# Patient Record
Sex: FEMALE | Race: WHITE | Employment: FULL TIME | URBAN - METROPOLITAN AREA
[De-identification: names, ages, dates, MRNs, and addresses within clinical notes are randomized per-mention and may not be internally consistent; named-entity substitution may affect disease eponyms.]

---

## 2017-04-26 ENCOUNTER — OFFICE VISIT (OUTPATIENT)
Dept: MIDWIFE SERVICES | Age: 32
End: 2017-04-26

## 2017-04-26 VITALS
WEIGHT: 238 LBS | HEIGHT: 70 IN | HEART RATE: 73 BPM | BODY MASS INDEX: 34.07 KG/M2 | DIASTOLIC BLOOD PRESSURE: 45 MMHG | SYSTOLIC BLOOD PRESSURE: 129 MMHG

## 2017-04-26 DIAGNOSIS — R87.611 PAPANICOLAOU SMEAR OF CERVIX WITH ATYPICAL SQUAMOUS CELLS CANNOT EXCLUDE HIGH GRADE SQUAMOUS INTRAEPITHELIAL LESION (ASC-H): ICD-10-CM

## 2017-04-26 DIAGNOSIS — N92.0 EXCESSIVE OR FREQUENT MENSTRUATION: ICD-10-CM

## 2017-04-26 RX ORDER — DROSPIRENONE AND ETHINYL ESTRADIOL 0.02-3(28)
1 KIT ORAL DAILY
Qty: 3 PACKAGE | Refills: 4 | Status: SHIPPED | OUTPATIENT
Start: 2017-04-26 | End: 2018-05-08 | Stop reason: SDUPTHER

## 2017-04-26 NOTE — PROGRESS NOTES
Well Woman Check Up       Subjective:     32 y.o. CaucasianF   LMP: 2017 for routine annual exam    Social History: single partner, contraception - OCP (Oral Contraceptive Pills). Pertinent past medical history: . PAP History:     CERVICAL/ENDOCERVICAL IMAGE PROCESSED THIN PREP  SATISFACTORY FOR EVALUATION  NEGATIVE FOR INTRAEITHELIAL LESION OR MALIGNANCY   Positive HR HPV    Preventive Measures. Colonoscopy:  Age 48 or earlier based on signigicant FH  Mammogram:  Age 48 or earlier based on FH  DEXA: Age 72 or sooner based on risk factors  Lipids:  Age 36 or based on FH/risk factors      Patient Active Problem List    Diagnosis Date Noted    History of cervical dysplasia 2014    Reactive hypoglycemia 2011     Current Outpatient Prescriptions   Medication Sig Dispense Refill    drospirenone-ethinyl estradiol (MARY) 3-0.02 mg tab Take 1 Tab by mouth daily. Indications: Pregnancy Contraception 3 Package 4     No Known Allergies  Family History   Problem Relation Age of Onset    Cancer Maternal Grandmother      colon    MS Father     Diabetes Maternal Grandfather     Hypertension Mother     Hypertension Maternal Grandfather     High Cholesterol Mother         ROS:  Feeling well. No dyspnea or chest pain on exertion. No abdominal pain, change in bowel habits, black or bloody stools. No urinary tract symptoms. GYN ROS: normal menses, no abnormal bleeding, pelvic pain or discharge, no breast pain or new or enlarging lumps on self exam. No neurological complaints. Objective:     Visit Vitals    /45    Pulse 73    Ht 5' 10\" (1.778 m)    Wt 238 lb (108 kg)    LMP 2017    Breastfeeding No    BMI 34.15 kg/m2     Gen: The patient appears well, alert, oriented x 3, in no distress. ENT:  normal.   Neck: supple. No adenopathy or thyromegaly. JOSE. Lungs:  clear, good air entry, no wheezes, rhonchi or rales.    CV: S1 and S2 normal, no murmurs, regular rate and rhythm. Abdomen: soft without tenderness, guarding, mass or organomegaly. Extremities:  no edema, normal peripheral pulses. Neurological:normal, no focal findings. BREAST EXAM: Examined upright and supine. Benign symmetrical breasts with everted nipples. No masses, no nodes. No nipple retraction or discharge. No skin retraction or subclavicular or axillary lymphadenopathy. PELVIC EXAM: VULVA: normal appearing vulva with no masses, tenderness or lesions, VAGINA: normal appearing vagina with normal color and discharge, no lesions, CERVIX: normal appearing cervix without discharge or lesions, UTERUS: uterus is normal size, shape, consistency and nontender, ADNEXA: normal adnexa in size, nontender and no masses, PAP: Pap smear done today, thin-prep method, HPV test exam chaperoned by:  Enrique Aguirre RN      Assessment/Plan:     Raiza Connelly was seen today for well woman. Diagnoses and all orders for this visit:    Papanicolaou smear of cervix with atypical squamous cells cannot exclude high grade squamous intraepithelial lesion (ASC-H)  -     drospirenone-ethinyl estradiol (MARY) 3-0.02 mg tab; Take 1 Tab by mouth daily. Indications: Pregnancy Contraception    Excessive or frequent menstruation  -     drospirenone-ethinyl estradiol (MARY) 3-0.02 mg tab; Take 1 Tab by mouth daily. Indications: Pregnancy Contraception      Follow-up Disposition:  Return in about 1 year (around 4/26/2018) for Annual exam or sooner pending pap results. Magdaleno Woods MD, [address] [place] [address], Retired    Salvatore Dooley, [address] [facility]

## 2017-04-26 NOTE — PROGRESS NOTES
Chief Complaint   Patient presents with    Well Woman     Visit Vitals    /45    Pulse 73    Ht 5' 10\" (1.778 m)    Wt 238 lb (108 kg)    Breastfeeding No    BMI 34.15 kg/m2     1. Have you been to the ER, urgent care clinic since your last visit? Hospitalized since your last visit? No    2. Have you seen or consulted any other health care providers outside of the [facility] since your last visit? Include any pap smears or colon screening. No     Here today for well woman exam.  She has no complaints. She would like to be put back on the pill.

## 2017-04-26 NOTE — PATIENT INSTRUCTIONS
Thank you for choosing [facility]. We know you have many options when it comes to your healthcare; we appreciate that you picked us. Our goal is to provide exceptional service and world class care for every patient. You may receive a survey in the mail or by email asking for your feedback. Please take a few minutes to share your thoughts about your recent visit. Your comments helps us understand what we do well and what we can do better. To ensure confidentiality, this survey is administered by an independent third-party, [address] [place] [address] participation will help us to continue and improve the quality of care that we provide to you, your family, friends, and neighbors. Thank you! Abnormal Pap Test: Care Instructions  Your Care Instructions  A Pap test (also called a Pap smear) is used to look for early changes that may become cancer of the cervix. Your Pap test was abnormal. That may mean that some cells in your cervix have changed. The cell changes are most often caused by human papillomavirus (HPV) infection. An abnormal Pap test does not mean that the abnormal cells will lead to cancer. Changes in cervical cells may go away on their own or may progress slowly. Your doctor may want to repeat the Pap test or have you take other tests to see if you have cell changes. It is very important that you have regular Pap tests after you've had an abnormal Pap test.  Follow-up care is a key part of your treatment and safety. Be sure to make and go to all appointments, and call your doctor if you are having problems. It's also a good idea to know your test results and keep a list of the medicines you take. How can you care for yourself at home? · Do not smoke. Smoking may increase your risk for cervical cell changes. If you need help quitting, talk to your doctor about stop-smoking programs and medicines. These can increase your chances of quitting for good.   · Be sure to get follow-up Pap tests or other follow-up tests as recommended by your doctor. When should you call for help? Watch closely for changes in your health, and be sure to contact your doctor if you have any problems. Where can you learn more? Go to http://vivian-leonardo.info/. Enter P925 in the search box to learn more about \"Abnormal Pap Test: Care Instructions. \"  Current as of: July 26, 2016  Content Version: 11.2  © 4767-2086 [employer]. Care instructions adapted under license by [employer] (which disclaims liability or warranty for this information). If you have questions about a medical condition or this instruction, always ask your healthcare professional. [address] any warranty or liability for your use of this information.

## 2017-05-03 LAB
CYTOLOGIST CVX/VAG CYTO: ABNORMAL
CYTOLOGY CVX/VAG DOC THIN PREP: ABNORMAL
DX ICD CODE: ABNORMAL
DX ICD CODE: ABNORMAL
HPV I/H RISK 1 DNA CVX QL PROBE+SIG AMP: POSITIVE
Lab: ABNORMAL
OTHER STN SPEC: ABNORMAL
PATH REPORT.FINAL DX SPEC: ABNORMAL
PATHOLOGIST CVX/VAG CYTO: ABNORMAL
STAT OF ADQ CVX/VAG CYTO-IMP: ABNORMAL

## 2017-05-19 ENCOUNTER — OFFICE VISIT (OUTPATIENT)
Dept: MIDWIFE SERVICES | Age: 32
End: 2017-05-19

## 2017-05-19 ENCOUNTER — TELEPHONE (OUTPATIENT)
Dept: MIDWIFE SERVICES | Age: 32
End: 2017-05-19

## 2017-05-19 VITALS
HEIGHT: 70 IN | WEIGHT: 236 LBS | BODY MASS INDEX: 33.79 KG/M2 | HEART RATE: 68 BPM | SYSTOLIC BLOOD PRESSURE: 134 MMHG | DIASTOLIC BLOOD PRESSURE: 74 MMHG

## 2017-05-19 DIAGNOSIS — R87.613 HIGH GRADE SQUAMOUS INTRAEPITHELIAL CERVICAL DYSPLASIA: Primary | ICD-10-CM

## 2017-05-19 NOTE — PROGRESS NOTES
SURGICAL PROCEDURE Audra Kruse  :  1985  Age: 28 y.o. Surgical Procedure Description:  LEEP Cone under anesthesia   [address]      ICD-10-CM ICD-9-CM    1.  High grade squamous intraepithelial cervical dysplasia R87.613 795.04      Patient Active Problem List   Diagnosis Code    Reactive hypoglycemia E16.1    High grade squamous intraepithelial cervical dysplasia R87.613       Requested Date & Time:   17  TBD  Length of Procedure:  30 minutes  Anesthesia:  Choice  Same Day ?:  Yes  Admit?  no  Pre-Admission Testing? no   PAT Dx Codes  Z01.810    Cardiology     Z01.811 Respiratory   Z01.812 Laboratory   Z01.818 Other    Date and Time of PAT:___________________________________________________    SURGERY POSTED BY: _________________________________________________  Patient's Insurance Company:  ______________________________________________   Telephone #: ______________________________________________________   Member #      ______________________________________________________   Group #         ______________________________________________________   Authorization #  ____________________________________________________  Authorization per:  _______________________  Date & Time ________________  Patient notified:  Date ______________  Time:  _______________________  Packet given      Date ______________  On provider calendar:  _____________   Pre-certification notified  __________________  Authorization info entered into [employer]  ____________  By:  _______________________

## 2017-05-19 NOTE — PROGRESS NOTES
Chief Complaint   Patient presents with    Advice Only     discussing setting up LEEP     Visit Vitals    /74    Pulse 68    Ht 5' 10\" (1.778 m)    Wt 236 lb (107 kg)    BMI 33.86 kg/m2     1. Have you been to the ER, urgent care clinic since your last visit? Hospitalized since your last visit? No    2. Have you seen or consulted any other health care providers outside of the [facility] since your last visit? Include any pap smears or colon screening.  No     Here today for discussion and setting up a LEEP for her abnormal pap

## 2017-05-19 NOTE — LETTER
NOTIFICATION OF RETURN TO WORK / SCHOOL 
 
5/19/2017 9:52 AM 
 
Ms. Meagan Vega 
[address] [facility] [id number] [facility] Yamini To Whom It May Concern: 
 
Meagan Vega is going to be under the care of [address] from May 26, 2017 to May 29, 2017. She will be able to return to work/school on May 30, 2017 with no restrictions. If there are questions or concerns please have the patient contact our office. Sincerely, Nae Bello MD

## 2017-05-25 ENCOUNTER — ANESTHESIA EVENT (OUTPATIENT)
Dept: SURGERY | Age: 32
End: 2017-05-25
Payer: COMMERCIAL

## 2017-05-25 NOTE — PERIOP NOTES
Spoke with Yusef Peña from Dr. Gabriella Copeland office requesting orders for surgery.   DOS: 5/26/2017

## 2017-05-25 NOTE — PERIOP NOTES
[facility]  PREOPERATIVE INSTRUCTIONS    Surgery Date:  5/26/2017  Surgery arrival time given by surgeon: NO   If no,[facility] staff will call you between 4 PM- 8 PM the day before surgery with your arrival time. If your surgery is on a Monday, we will call you the preceding Friday. Please call [phone] after 8 PM if you did not receive your arrival time. 1. Please report at the designated time to the 2nd [facility]. Bring your insurance card, photo identification, and any copayment ( if applicable). 2. You must have a responsible adult to drive you home. You need to have a responsible adult to stay with you the first 24 hours after surgery if you are going home the same day of your surgery and you should not drive a car for 24 hours following your surgery. 3. Nothing to eat or drink after midnight the night before surgery. This includes no water, gum, mints, coffee, juice, etc.  Please note special instructions, if applicable, below for medications. 4. MEDICATIONS TO TAKE THE MORNING OF SURGERY WITH A SIP OF WATER: None  5. You MAY take your pain medication the day of surgery with a sip of water. 6. No alcoholic beverages 24 hours before or after your surgery. 7. If you are being admitted to the hospital,please leave personal belongings/luggage in your car until you have an assigned hospital room number. 8. Stop Aspirin and/or any non-steroidal anti-inflammatory drugs (i.e. Ibuprofen, Naproxen, Advil, Aleve) as directed by your prescribing physician. You may take Tylenol. Stop herbal supplements 1 week prior to  surgery. 9. If you are currently taking Plavix, Coumadin,or any other blood-thinning/anticoagulant medication contact your prescribing physician for instructions. 10. Please wear comfortable clothes. Wear your glasses instead of contacts. We ask that all money, jewelry and valuables be left at home. Wear no make up, particularly mascara, the day of surgery.    11.  All body piercings, rings,and jewelry need to be removed and left at home. Please wear your hair loose or down. Please no pony-tails, buns, or any metal hair accessories. If you shower the morning of surgery, please do not apply any lotions, powders, or deodorants afterwards. Do not shave any body area within 24 hours of your surgery. 12. Please follow all instructions to avoid any potential surgical cancellation. 13. Should your physical condition change, (i.e. fever, cold, flu, etc.) please notify your surgeon as soon as possible. 14. It is important to be on time. If a situation occurs where you may be delayed, please call:  [phone] on the day of surgery. 15. The Preadmission Testing staff can be reached at [phone]. Sidney Barthel 16. Special instructions:   1. Please bring your completed medication sheet with you the day of surgery. Please update any changes in medications. 2. Free  parking  The patient was contacted  via phone. She  verbalize  understanding of all instructions does not  need reinforcement.

## 2017-05-26 ENCOUNTER — ANESTHESIA (OUTPATIENT)
Dept: SURGERY | Age: 32
End: 2017-05-26
Payer: COMMERCIAL

## 2017-05-26 ENCOUNTER — HOSPITAL ENCOUNTER (OUTPATIENT)
Age: 32
Setting detail: OUTPATIENT SURGERY
Discharge: HOME | End: 2017-05-26
Attending: OBSTETRICS & GYNECOLOGY | Admitting: OBSTETRICS & GYNECOLOGY
Payer: COMMERCIAL

## 2017-05-26 VITALS
DIASTOLIC BLOOD PRESSURE: 56 MMHG | HEIGHT: 70 IN | SYSTOLIC BLOOD PRESSURE: 129 MMHG | OXYGEN SATURATION: 99 % | BODY MASS INDEX: 33.93 KG/M2 | WEIGHT: 236.99 LBS | HEART RATE: 68 BPM | RESPIRATION RATE: 17 BRPM | TEMPERATURE: 98.3 F

## 2017-05-26 DIAGNOSIS — R87.613 HIGH GRADE SQUAMOUS INTRAEPITHELIAL CERVICAL DYSPLASIA: ICD-10-CM

## 2017-05-26 LAB — HCG UR QL: NEGATIVE

## 2017-05-26 PROCEDURE — 77030020143 HC AIRWY LARYN INTUB CGAS -A: Performed by: ANESTHESIOLOGY

## 2017-05-26 PROCEDURE — 74011000250 HC RX REV CODE- 250

## 2017-05-26 PROCEDURE — 76210000020 HC REC RM PH II FIRST 0.5 HR: Performed by: OBSTETRICS & GYNECOLOGY

## 2017-05-26 PROCEDURE — 74011000250 HC RX REV CODE- 250: Performed by: OBSTETRICS & GYNECOLOGY

## 2017-05-26 PROCEDURE — 74011250636 HC RX REV CODE- 250/636: Performed by: ANESTHESIOLOGY

## 2017-05-26 PROCEDURE — 88305 TISSUE EXAM BY PATHOLOGIST: CPT | Performed by: OBSTETRICS & GYNECOLOGY

## 2017-05-26 PROCEDURE — 77030005537 HC CATH URETH BARD -A: Performed by: OBSTETRICS & GYNECOLOGY

## 2017-05-26 PROCEDURE — 77030020782 HC GWN BAIR PAWS FLX 3M -B

## 2017-05-26 PROCEDURE — 74011250636 HC RX REV CODE- 250/636

## 2017-05-26 PROCEDURE — 77030011640 HC PAD GRND REM COVD -A: Performed by: OBSTETRICS & GYNECOLOGY

## 2017-05-26 PROCEDURE — 77030018722 HC ELCTRD BALL LEEP UTMD -B: Performed by: OBSTETRICS & GYNECOLOGY

## 2017-05-26 PROCEDURE — 81025 URINE PREGNANCY TEST: CPT

## 2017-05-26 PROCEDURE — 77030032490 HC SLV COMPR SCD KNE COVD -B

## 2017-05-26 PROCEDURE — 76210000006 HC OR PH I REC 0.5 TO 1 HR: Performed by: OBSTETRICS & GYNECOLOGY

## 2017-05-26 PROCEDURE — 88307 TISSUE EXAM BY PATHOLOGIST: CPT | Performed by: OBSTETRICS & GYNECOLOGY

## 2017-05-26 PROCEDURE — 77030003666 HC NDL SPINAL BD -A: Performed by: OBSTETRICS & GYNECOLOGY

## 2017-05-26 PROCEDURE — 76010000138 HC OR TIME 0.5 TO 1 HR: Performed by: OBSTETRICS & GYNECOLOGY

## 2017-05-26 PROCEDURE — 76060000032 HC ANESTHESIA 0.5 TO 1 HR: Performed by: OBSTETRICS & GYNECOLOGY

## 2017-05-26 RX ORDER — IBUPROFEN 800 MG/1
800 TABLET ORAL
Qty: 20 TAB | Refills: 0 | Status: SHIPPED | OUTPATIENT
Start: 2017-05-26 | End: 2019-05-05

## 2017-05-26 RX ORDER — LIDOCAINE HYDROCHLORIDE 20 MG/ML
INJECTION, SOLUTION EPIDURAL; INFILTRATION; INTRACAUDAL; PERINEURAL AS NEEDED
Status: DISCONTINUED | OUTPATIENT
Start: 2017-05-26 | End: 2017-05-26 | Stop reason: HOSPADM

## 2017-05-26 RX ORDER — HYDROMORPHONE HYDROCHLORIDE 1 MG/ML
.25-1 INJECTION, SOLUTION INTRAMUSCULAR; INTRAVENOUS; SUBCUTANEOUS
Status: DISCONTINUED | OUTPATIENT
Start: 2017-05-26 | End: 2017-05-26 | Stop reason: HOSPADM

## 2017-05-26 RX ORDER — DIPHENHYDRAMINE HYDROCHLORIDE 50 MG/ML
12.5 INJECTION, SOLUTION INTRAMUSCULAR; INTRAVENOUS AS NEEDED
Status: DISCONTINUED | OUTPATIENT
Start: 2017-05-26 | End: 2017-05-26 | Stop reason: HOSPADM

## 2017-05-26 RX ORDER — DEXAMETHASONE SODIUM PHOSPHATE 4 MG/ML
INJECTION, SOLUTION INTRA-ARTICULAR; INTRALESIONAL; INTRAMUSCULAR; INTRAVENOUS; SOFT TISSUE AS NEEDED
Status: DISCONTINUED | OUTPATIENT
Start: 2017-05-26 | End: 2017-05-26 | Stop reason: HOSPADM

## 2017-05-26 RX ORDER — MIDAZOLAM HYDROCHLORIDE 1 MG/ML
INJECTION, SOLUTION INTRAMUSCULAR; INTRAVENOUS AS NEEDED
Status: DISCONTINUED | OUTPATIENT
Start: 2017-05-26 | End: 2017-05-26 | Stop reason: HOSPADM

## 2017-05-26 RX ORDER — PROPOFOL 10 MG/ML
INJECTION, EMULSION INTRAVENOUS AS NEEDED
Status: DISCONTINUED | OUTPATIENT
Start: 2017-05-26 | End: 2017-05-26 | Stop reason: HOSPADM

## 2017-05-26 RX ORDER — SODIUM CHLORIDE, SODIUM LACTATE, POTASSIUM CHLORIDE, CALCIUM CHLORIDE 600; 310; 30; 20 MG/100ML; MG/100ML; MG/100ML; MG/100ML
125 INJECTION, SOLUTION INTRAVENOUS CONTINUOUS
Status: DISCONTINUED | OUTPATIENT
Start: 2017-05-26 | End: 2017-05-26 | Stop reason: HOSPADM

## 2017-05-26 RX ORDER — LIDOCAINE HYDROCHLORIDE 10 MG/ML
0.1 INJECTION, SOLUTION EPIDURAL; INFILTRATION; INTRACAUDAL; PERINEURAL AS NEEDED
Status: DISCONTINUED | OUTPATIENT
Start: 2017-05-26 | End: 2017-05-26 | Stop reason: HOSPADM

## 2017-05-26 RX ORDER — FERRIC SUBSULFATE 20-22G/100
SOLUTION, NON-ORAL MISCELLANEOUS AS NEEDED
Status: DISCONTINUED | OUTPATIENT
Start: 2017-05-26 | End: 2017-05-26 | Stop reason: HOSPADM

## 2017-05-26 RX ORDER — FLUMAZENIL 0.1 MG/ML
0.2 INJECTION INTRAVENOUS
Status: DISCONTINUED | OUTPATIENT
Start: 2017-05-26 | End: 2017-05-26 | Stop reason: HOSPADM

## 2017-05-26 RX ORDER — FENTANYL CITRATE 50 UG/ML
INJECTION, SOLUTION INTRAMUSCULAR; INTRAVENOUS AS NEEDED
Status: DISCONTINUED | OUTPATIENT
Start: 2017-05-26 | End: 2017-05-26 | Stop reason: HOSPADM

## 2017-05-26 RX ORDER — LIDOCAINE HYDROCHLORIDE AND EPINEPHRINE 10; 10 MG/ML; UG/ML
INJECTION, SOLUTION INFILTRATION; PERINEURAL AS NEEDED
Status: DISCONTINUED | OUTPATIENT
Start: 2017-05-26 | End: 2017-05-26 | Stop reason: HOSPADM

## 2017-05-26 RX ORDER — NALOXONE HYDROCHLORIDE 0.4 MG/ML
0.2 INJECTION, SOLUTION INTRAMUSCULAR; INTRAVENOUS; SUBCUTANEOUS
Status: DISCONTINUED | OUTPATIENT
Start: 2017-05-26 | End: 2017-05-26 | Stop reason: HOSPADM

## 2017-05-26 RX ORDER — MIDAZOLAM HYDROCHLORIDE 1 MG/ML
2 INJECTION, SOLUTION INTRAMUSCULAR; INTRAVENOUS
Status: DISCONTINUED | OUTPATIENT
Start: 2017-05-26 | End: 2017-05-26 | Stop reason: HOSPADM

## 2017-05-26 RX ORDER — SODIUM CHLORIDE 0.9 % (FLUSH) 0.9 %
5-10 SYRINGE (ML) INJECTION EVERY 8 HOURS
Status: DISCONTINUED | OUTPATIENT
Start: 2017-05-26 | End: 2017-05-26 | Stop reason: HOSPADM

## 2017-05-26 RX ORDER — SODIUM CHLORIDE 0.9 % (FLUSH) 0.9 %
5-10 SYRINGE (ML) INJECTION AS NEEDED
Status: DISCONTINUED | OUTPATIENT
Start: 2017-05-26 | End: 2017-05-26 | Stop reason: HOSPADM

## 2017-05-26 RX ORDER — ACETIC ACID 5 %
LIQUID (ML) MISCELLANEOUS AS NEEDED
Status: DISCONTINUED | OUTPATIENT
Start: 2017-05-26 | End: 2017-05-26 | Stop reason: HOSPADM

## 2017-05-26 RX ORDER — ONDANSETRON 2 MG/ML
INJECTION INTRAMUSCULAR; INTRAVENOUS AS NEEDED
Status: DISCONTINUED | OUTPATIENT
Start: 2017-05-26 | End: 2017-05-26 | Stop reason: HOSPADM

## 2017-05-26 RX ADMIN — LIDOCAINE HYDROCHLORIDE 40 MG: 20 INJECTION, SOLUTION EPIDURAL; INFILTRATION; INTRACAUDAL; PERINEURAL at 07:33

## 2017-05-26 RX ADMIN — FENTANYL CITRATE 50 MCG: 50 INJECTION, SOLUTION INTRAMUSCULAR; INTRAVENOUS at 07:45

## 2017-05-26 RX ADMIN — HYDROMORPHONE HYDROCHLORIDE 0.5 MG: 1 INJECTION, SOLUTION INTRAMUSCULAR; INTRAVENOUS; SUBCUTANEOUS at 08:43

## 2017-05-26 RX ADMIN — FENTANYL CITRATE 50 MCG: 50 INJECTION, SOLUTION INTRAMUSCULAR; INTRAVENOUS at 07:30

## 2017-05-26 RX ADMIN — HYDROMORPHONE HYDROCHLORIDE 0.5 MG: 1 INJECTION, SOLUTION INTRAMUSCULAR; INTRAVENOUS; SUBCUTANEOUS at 08:31

## 2017-05-26 RX ADMIN — DEXAMETHASONE SODIUM PHOSPHATE 4 MG: 4 INJECTION, SOLUTION INTRA-ARTICULAR; INTRALESIONAL; INTRAMUSCULAR; INTRAVENOUS; SOFT TISSUE at 07:38

## 2017-05-26 RX ADMIN — MIDAZOLAM HYDROCHLORIDE 2 MG: 1 INJECTION, SOLUTION INTRAMUSCULAR; INTRAVENOUS at 07:30

## 2017-05-26 RX ADMIN — SODIUM CHLORIDE, SODIUM LACTATE, POTASSIUM CHLORIDE, AND CALCIUM CHLORIDE 125 ML/HR: 600; 310; 30; 20 INJECTION, SOLUTION INTRAVENOUS at 06:52

## 2017-05-26 RX ADMIN — PROPOFOL 150 MG: 10 INJECTION, EMULSION INTRAVENOUS at 07:33

## 2017-05-26 RX ADMIN — ONDANSETRON 4 MG: 2 INJECTION INTRAMUSCULAR; INTRAVENOUS at 08:04

## 2017-05-26 NOTE — ANESTHESIA POSTPROCEDURE EVALUATION
Post-Anesthesia Evaluation and Assessment    Patient: Andrew Merazn MRN: [id number]  SSN: [id number]    YOB: 1985  Age: 28 y.o. Sex: female       Cardiovascular Function/Vital Signs  Visit Vitals    /62    Pulse 74    Temp 36.8 °C (98.3 °F)    Resp 17    Ht 5' 10\" (1.778 m)    Wt 107.5 kg (236 lb 15.9 oz)    SpO2 98%    BMI 34.01 kg/m2       Patient is status post general anesthesia for Procedure(s):  LOOP ELECTRODE EXCISION PROCEDURE OF CERVIX (LEEP)/ CONIZATION. Nausea/Vomiting: None    Postoperative hydration reviewed and adequate. Pain:  Pain Scale 1: Numeric (0 - 10) (05/26/17 0843)  Pain Intensity 1: 2 (05/26/17 0843)   Managed    Neurological Status:   Neuro (WDL): Within Defined Limits (05/26/17 0848)  Neuro  Neurologic State: Alert (05/26/17 0848)  Orientation Level: Oriented X4 (05/26/17 0810)  Cognition: Appropriate decision making; Follows commands (05/26/17 0810)  LUE Motor Response: Purposeful (05/26/17 0848)  LLE Motor Response: Purposeful (05/26/17 0848)  RUE Motor Response: Purposeful (05/26/17 0848)  RLE Motor Response: Purposeful (05/26/17 0848)   At baseline    Mental Status and Level of Consciousness: Arousable    Pulmonary Status:   O2 Device: Room air (05/26/17 0854)   Adequate oxygenation and airway patent    Complications related to anesthesia: None    Post-anesthesia assessment completed.  No concerns    Signed By: Jalen Salgado MD     May 26, 2017

## 2017-05-26 NOTE — H&P (VIEW-ONLY)
Well Woman Check Up       Subjective:     32 y.o. CaucasianF   LMP: 2017 for routine annual exam    Social History: single partner, contraception - OCP (Oral Contraceptive Pills). Pertinent past medical history: . PAP History:     CERVICAL/ENDOCERVICAL IMAGE PROCESSED THIN PREP  SATISFACTORY FOR EVALUATION  NEGATIVE FOR INTRAEITHELIAL LESION OR MALIGNANCY   Positive HR HPV    Preventive Measures. Colonoscopy:  Age 48 or earlier based on signigicant FH  Mammogram:  Age 48 or earlier based on FH  DEXA: Age 72 or sooner based on risk factors  Lipids:  Age 36 or based on FH/risk factors      Patient Active Problem List    Diagnosis Date Noted    History of cervical dysplasia 2014    Reactive hypoglycemia 2011     Current Outpatient Prescriptions   Medication Sig Dispense Refill    drospirenone-ethinyl estradiol (MARY) 3-0.02 mg tab Take 1 Tab by mouth daily. Indications: Pregnancy Contraception 3 Package 4     No Known Allergies  Family History   Problem Relation Age of Onset    Cancer Maternal Grandmother      colon    MS Father     Diabetes Maternal Grandfather     Hypertension Mother     Hypertension Maternal Grandfather     High Cholesterol Mother         ROS:  Feeling well. No dyspnea or chest pain on exertion. No abdominal pain, change in bowel habits, black or bloody stools. No urinary tract symptoms. GYN ROS: normal menses, no abnormal bleeding, pelvic pain or discharge, no breast pain or new or enlarging lumps on self exam. No neurological complaints. Objective:     Visit Vitals    /45    Pulse 73    Ht 5' 10\" (1.778 m)    Wt 238 lb (108 kg)    LMP 2017    Breastfeeding No    BMI 34.15 kg/m2     Gen: The patient appears well, alert, oriented x 3, in no distress. ENT:  normal.   Neck: supple. No adenopathy or thyromegaly. JOSE. Lungs:  clear, good air entry, no wheezes, rhonchi or rales.    CV: S1 and S2 normal, no murmurs, regular rate and rhythm. Abdomen: soft without tenderness, guarding, mass or organomegaly. Extremities:  no edema, normal peripheral pulses. Neurological:normal, no focal findings. BREAST EXAM: Examined upright and supine. Benign symmetrical breasts with everted nipples. No masses, no nodes. No nipple retraction or discharge. No skin retraction or subclavicular or axillary lymphadenopathy. PELVIC EXAM: VULVA: normal appearing vulva with no masses, tenderness or lesions, VAGINA: normal appearing vagina with normal color and discharge, no lesions, CERVIX: normal appearing cervix without discharge or lesions, UTERUS: uterus is normal size, shape, consistency and nontender, ADNEXA: normal adnexa in size, nontender and no masses, PAP: Pap smear done today, thin-prep method, HPV test exam chaperoned by:  Santos Deluna RN      Assessment/Plan:     Kaylie Dougherty was seen today for well woman. Diagnoses and all orders for this visit:    Papanicolaou smear of cervix with atypical squamous cells cannot exclude high grade squamous intraepithelial lesion (ASC-H)  -     drospirenone-ethinyl estradiol (MARY) 3-0.02 mg tab; Take 1 Tab by mouth daily. Indications: Pregnancy Contraception    Excessive or frequent menstruation  -     drospirenone-ethinyl estradiol (MARY) 3-0.02 mg tab; Take 1 Tab by mouth daily. Indications: Pregnancy Contraception      Follow-up Disposition:  Return in about 1 year (around 4/26/2018) for Annual exam or sooner pending pap results. Bren Gibbons MD, [address] [place] [address], Retired    Edmar Dooley, [address]

## 2017-05-26 NOTE — DISCHARGE SUMMARY
Gynecology Surgical Discharge Summary     Name: Michael Yo MRN: [id number]  SSN: [id number]    YOB: 1985  Age: 28 y.o. Sex: female      Admit date: 5/26/2017    Discharge Date: 5/26/2017      Attending Physician: Kennon Heimlich, MD     Admission Diagnoses: High grade intraepithelial cervical dysplasia    Discharge Diagnoses: Principal Problem:    High grade squamous intraepithelial cervical dysplasia (2/3/2014)     Procedures: LEEP cone    Hospital Course: Normal hospital course for this procedure. Significant Diagnostic Studies:   Recent Results (from the past 24 hour(s))   HCG URINE, QL. - POC    Collection Time: 05/26/17  6:32 AM   Result Value Ref Range    Pregnancy test,urine (POC) NEGATIVE  NEG         Condition on Discharge:  normal    Patient Instructions:   Current Discharge Medication List      START taking these medications    Details   ibuprofen (MOTRIN) 800 mg tablet Take 1 Tab by mouth every eight (8) hours as needed for Pain. Indications: Pain  Qty: 20 Tab, Refills: 0         CONTINUE these medications which have NOT CHANGED    Details   drospirenone-ethinyl estradiol (MARY) 3-0.02 mg tab Take 1 Tab by mouth daily. Indications: Pregnancy Contraception  Qty: 3 Package, Refills: 4    Associated Diagnoses: Papanicolaou smear of cervix with atypical squamous cells cannot exclude high grade squamous intraepithelial lesion (ASC-H); Excessive or frequent menstruation            Activity: No sex, douching, or tampons for 6 weeks or as directed by your physician. No heavy lifting for 6 weeks. No driving while taking pain medication. Diet: Resume pre-hospital diet  Wound Care: None needed    Follow-up Appointments   Procedures    FOLLOW UP VISIT Appointment in: Two Weeks And 4-6 months for pap pending pathology     And 4-6 months for pap pending pathology     Standing Status:   Standing     Number of Occurrences:   1     Order Specific Question:   Appointment in     Answer:    Two Weeks        Signed By:          Tashi Torres MD, FACOG    LTC, AUS, Retired    Assistant Clinical Professor, [address]    May 26, 2017

## 2017-05-26 NOTE — OP NOTES
Operative Note    Preoperative Diagnosis: HIGH GRADE SQUAMOUS INTRAEPITHELIAL CERVICAL DYSPLASIA    Postoperative Diagnosis:  HIGH GRADE SQUAMOUS INTRAEPITHELIAL CERVICAL DYSPLASIA    Surgeon: Vanessa Kaplan MD    Assistants: Surgeon(s):  Vanessa Kaplan MD    Anesthesia:  General     Indications: The patient was admitted to the hospital with HGSIL. The patient now presents for LEEP cone after discussing therapeutic alternatives. Procedure in Detail:       After informed consent patient was taken to [address] at [facility], where she was transferred to the OR table in the supine position. After induction of General anesthesia the patient was moved to a  dorsal lithotomy position using yellow fin stirups. She was prepped and draped for a vaginal approach. Local anesthesia with epi injected at 4 quadrants for post op pain and hemorrhage control. The cervix was then cleaned with acetic acid followed by Lugol's solution and a LEEP cone was performed with ectocervical and endocervical specimens obtained and sent to pathology    The patient was awakening from general anesthesia and taken to the recovery room in stable condition. Findings:  No grossly visible lestions    Estimated Blood Loss: 10 mL           Drains: None                Specimens:   ID Type Source Tests Collected by Time Destination   1 : cervical leep Preservative Cervical  Vanessa Kaplan MD 5/26/2017 8565 Pathology   2 : endocervical leep Preservative Cervical  Vanessa Kaplan MD 5/26/2017 7606 Pathology               Implants: * No implants in log *           Complications:  None           Disposition: PACU - hemodynamically stable.            Condition: stable    Signed By: Vanessa Kaplan MD                         May 26, 2017

## 2017-05-26 NOTE — IP AVS SNAPSHOT
[address] [facility], [place] 
 
 
 [address] 
[phone] Patient: Joel Das MRN: [id number] IVET:[birth year] You are allergic to the following No active allergies Recent Documentation Height Weight BMI OB Status Smoking Status 1.778 m 107.5 kg 34.01 kg/m2 Having regular periods Never Smoker Emergency Contacts Name Discharge Info Relation Home Work Mobile Gina Andersen DISCHARGE CAREGIVER [3] Mother [14] [phone] About your hospitalization You were admitted on:  May 26, 2017 You last received care in the:  [facility] PACU You were discharged on:  May 26, 2017 Unit phone number:  [phone] Why you were hospitalized Your primary diagnosis was:  High Grade Squamous Intraepithelial Cervical Dysplasia Providers Seen During Your Hospitalizations Provider Role Specialty Primary office phone Janae Martinez MD Attending Provider Obstetrics & Gynecology [phone] Your Primary Care Physician (PCP) Primary Care Physician Office Phone Office Fax Víctor Loaiza [phone] Follow-up Information Follow up With Details Comments Contact Info MD Kisha Beckwith  [address] Internal Medicine [address] 
[phone] In 2 weeks Current Discharge Medication List  
  
START taking these medications Dose & Instructions Dispensing Information Comments Morning Noon Evening Bedtime  
 ibuprofen 800 mg tablet Commonly known as:  MOTRIN Your last dose was: Your next dose is:    
   
   
 Dose:  800 mg Take 1 Tab by mouth every eight (8) hours as needed for Pain. Indications: Pain Quantity:  20 Tab Refills:  0 CONTINUE these medications which have NOT CHANGED Dose & Instructions Dispensing Information Comments Morning Noon Evening Bedtime  
 drospirenone-ethinyl estradiol 3-0.02 mg Tab Commonly known as:  MARY Your last dose was: Your next dose is:    
   
   
 Dose:  1 Tab Take 1 Tab by mouth daily. Indications: Pregnancy Contraception Quantity:  3 Package Refills:  4 Where to Get Your Medications These medications were sent to [facility] [address] AT [address] [facility] [address] [place] [address], [address] [facility] [address] Hours:  24-hours Phone:  [phone]  
  ibuprofen 800 mg tablet Discharge Instructions Instructions Following Ambulatory Surgery Activity · As tolerated · Bathe or shower Diet · Regular diet · If nausea and vomiting continues, call your doctor Pain · Take pain medication as directed by your doctor ·  Call your doctor if pain is NOT relieved by medication · DO NOT take aspirin or blood thinners for 48 hours Dressing Care: none Follow-Up Phone Calls · Will be made nursing staff · If you have any problems, call your doctor as needed Call your doctor if 
· Excessive bleeding that does not stop after holding mild pressure over the area · Temperature of 101 degrees F or above · Redness,excessive swelling or bruising, and/or green or yellow, smelly discharge from incision After Anesthesia · For the first 24 hours: DO NOT Drive, Drink alcoholic beverages, or Make important decisions · Be aware of dizziness following anesthesia and while taking pain medication Medication changes have been made by your doctor; see Universal Medication form Continue home medications as previously prescribed with the addition of: Other Instructions: 
 
Appointment date/time:  2 week follow appointment Your doctor's phone number:  [phone] DISCHARGE SUMMARY from your Nurse The following personal items collected during your admission are returned to you: Dental Appliance: Dental Appliances: None Vision: Visual Aid: None Hearing Aid:   
Jewelry: Jewelry: None Clothing: Clothing: Footwear, Pants, Shirt, Undergarments (locker) Other Valuables: Other Valuables: Cell Phone, Purse (given to friend) Valuables sent to safe:   
 
PATIENT INSTRUCTIONS: 
 
After general anesthesia or intravenous sedation, for 24 hours or while taking prescription Narcotics: · Limit your activities · Do not drive and operate hazardous machinery · Do not make important personal or business decisions · Do  not drink alcoholic beverages · If you have not urinated within 8 hours after discharge, please contact your surgeon on call. Report the following to your surgeon: 
· Excessive pain, swelling, redness or odor of or around the surgical area · Temperature over 100.5 · Nausea and vomiting lasting longer than 4 hours or if unable to take medications · Any signs of decreased circulation or nerve impairment to extremity: change in color, persistent  numbness, tingling, coldness or increase pain · Any questions [address] Breathing deep and coughing are very important exercises to do after surgery. Deep breathing and coughing open the little air tubes and air sacks in your lungs. You take deep breaths every day. You may not even notice - it is just something you do when you sigh or yawn. It is a natural exercise you do to keep these air passages open. After surgery, take deep breaths and cough, on purpose. Coughing and deep breathing help prevent bronchitis and pneumonia after surgery. If you had chest or belly surgery, use a pillow as a \"hug buddy\" and hold it tightly to your chest or belly when you cough. DIRECTIONS: 
6. Take 10 to 15 slow deep breaths every hour while awake. 7. Breathe in deeply, and hold it for 2 seconds. 8. Exhale slowly through puckered lips, like blowing up a balloon. 9. After every 4th or 5th deep breath, hug your pillow to your chest or belly and give a hard, deep cough. Yes, it will probably hurt. But doing this exercise is very important part of healing after surgery. Take your pain medicine to help you do this exercise without too much pain. IF YOU HAVE BEEN DIAGNOSED WITH SLEEP APNEA, PLEASE USE YOUR SLEEIFP APNEA DEVICE OR CPAP MACHINE WHEN YOU INTEND TO NAP AFTER TAKING PAIN MEDICATION. Ankle Pumps Ankle pumps increase the circulation of oxygenated blood to your lower extremities and decrease your risk for circulation problems such as blood clots. They also stretch the muscles, tendons and ligaments in your foot and ankle, and prevent joint contracture in the ankle and foot, especially after surgeries on the legs. It is important to do ankle pump exercises regularly after surgery because immobility increases your risk for developing a blood clot. Your doctor may also have you take an Aspirin for the next few days as well. If your doctor did not ask you to take an Aspirin, consult with him before starting Aspirin therapy on your own. Slowly point your foot forward, feeling the muscles on the top of your lower leg stretch, and hold this position for 5 seconds. Next, pull your foot back toward you as far as possible, stretching the calf muscles, and hold that position for 5 seconds. Repeat with the other foot. Perform 10 repetitions every hour while awake for both ankles if possible (down and then up with the foot once is one repetition). You should feel gentle stretching of the muscles in your lower leg when doing this exercise. If you feel pain, or your range of motion is limited, don't  Push too hard. Only go the limit your joint and muscles will let you go. If you have increasing pain, progressively worsening leg warmth or swelling, STOP the exercise and call your doctor. Below is information about the medications your doctor is prescribing after your visit: 
 
Other information in your discharge envelope: PRESCRIPTIONS PHYSICAL THERAPY PRESCRIPTION 
     APPOINTMENT CARDS [facility] Pamphlet for block or block with On-Q Catheter from [facility] Medical device information sheets/pamphlets from their  School/work excuse note. /parent work excuse note. These are general instructions for a healthy lifestyle: *  Please give a list of your current medications to your Primary Care Provider. *  Please update this list whenever your medications are discontinued, doses are 
    changed, or new medications (including over-the-counter products) are added. *  Please carry medication information at all times in case of emergency situations. About Smoking No smoking / No tobacco products / Avoid exposure to second hand smoke Surgeon General's Warning:  Quitting smoking now greatly reduces serious risk to your health. Obesity, smoking, and sedentary lifestyle greatly increases your risk for illness and disease. A healthy diet, regular physical exercise & weight monitoring are important for maintaining a healthy lifestyle. Congestive Heart Failure You may be retaining fluid if you have a history of heart failure or if you experience any of the following symptoms:  Weight gain of 3 pounds or more overnight or 5 pounds in a week, increased swelling in our hands or feet or shortness of breath while lying flat in bed. Please call your doctor as soon as you notice any of these symptoms; do not wait until your next office visit. Recognize signs and symptoms of STROKE: 
F - face looks uneven A - arms unable to move or move even S - speech slurred or non-existent T - time-call 911 as soon as signs and symptoms begin-DO NOT go Back to bed or wait to see if you get better-TIME IS BRAIN. Warning signs of HEART ATTACK Call 911 if you have these symptoms · Chest discomfort. Most heart attacks involve discomfort in the center of the chest that lasts more than a few minutes, or that goes away and comes back. It can feel like uncomfortable pressure, squeezing, fullness, or pain. · Discomfort in other areas of the upper body. Symptoms can include pain or discomfort in one or both Arms, the back, neck, jaw, or stomach. ·  Shortness of breath with or without chest discomfort. · Other signs may include breaking out in a cold sweat, nausea, or lightheadedness Don't wait more than five minutes to call 911 - MINUTES MATTER! Fast action can save your life. Calling 911 is almost always the fastest way to get lifesaving treatment. Emergency Medical Services staff can begin treatment when they arrive - up to an hour sooner than if someone gets to the hospital by car. [facility] MEDICATION AND SIDE EFFECT GUIDE [facility] EFFECT GUIDE was provided to the PATIENT AND CARE PROVIDER. Information provided includes instruction about drug purpose and common side effects for the following medications: · Discharge Orders None Genmab Announcement We are excited to announce that we are making your provider's discharge notes available to you in Genmab. You will see these notes when they are completed and signed by the physician that discharged you from your recent hospital stay. If you have any questions or concerns about any information you see in Genmab, please call the Health Information Department where you were seen or reach out to your Primary Care Provider for more information about your plan of care. Introducing [facility] [place] [facility] SERVICES! Dear Osman Zavala: Thank you for requesting a Genmab account. Our records indicate that you already have an active Genmab account.   You can access your account anytime at [email or web address] Did you know that you can access your hospital and ER discharge instructions at any time in [email or web address]? You can also review all of your test results from your hospital stay or ER visit. Additional Information If you have questions, please visit the Frequently Asked Questions section of the [email or web address] website at [email or web address] Remember, [email or web address] is NOT to be used for urgent needs. For medical emergencies, dial 911. Now available from your iPhone and Android! General Information Please provide this summary of care documentation to your next provider. Patient Signature:  ____________________________________________________________ Date:  ____________________________________________________________  
  
[facility] Provider Signature:  ____________________________________________________________ Date:  ____________________________________________________________

## 2017-05-26 NOTE — INTERVAL H&P NOTE
H&P Update:  Cee Fisher was seen and examined. History and physical has been reviewed. The patient has been examined.  There have been no significant clinical changes since the completion of the originally dated History and Physical.    Signed By: Alicia Pierre MD     May 26, 2017 7:03 AM

## 2017-05-26 NOTE — DISCHARGE INSTRUCTIONS
Instructions Following Ambulatory Surgery    Activity  · As tolerated   · Bathe or shower     Diet  · Regular diet  · If nausea and vomiting continues, call your doctor    Pain  · Take pain medication as directed by your doctor  ·  Call your doctor if pain is NOT relieved by medication  · DO NOT take aspirin or blood thinners for 48 hours    Dressing Care: none    Follow-Up Phone Calls  · Will be made nursing staff  · If you have any problems, call your doctor as needed    Call your doctor if  · Excessive bleeding that does not stop after holding mild pressure over the area  · Temperature of 101 degrees F or above  · Redness,excessive swelling or bruising, and/or green or yellow, smelly discharge from incision    After Anesthesia  · For the first 24 hours: DO NOT Drive, Drink alcoholic beverages, or Make important decisions  · Be aware of dizziness following anesthesia and while taking pain medication    Medication changes have been made by your doctor; see Universal Medication form  Continue home medications as previously prescribed with the addition of: Other Instructions:    Appointment date/time:  2 week follow appointment  Your doctor's phone number:  [phone]      DISCHARGE SUMMARY from your Nurse    The following personal items collected during your admission are returned to you:   Dental Appliance: Dental Appliances: None  Vision: Visual Aid: None  Hearing Aid:    Jewelry: Jewelry: None  Clothing: Clothing: Footwear, Pants, Shirt, Undergarments (locker)  Other Valuables:  Other Valuables: Cell Phone, Purse (given to friend)  Valuables sent to safe:      PATIENT INSTRUCTIONS:    After general anesthesia or intravenous sedation, for 24 hours or while taking prescription Narcotics:  · Limit your activities  · Do not drive and operate hazardous machinery  · Do not make important personal or business decisions  · Do  not drink alcoholic beverages  · If you have not urinated within 8 hours after discharge, please contact your surgeon on call. Report the following to your surgeon:  · Excessive pain, swelling, redness or odor of or around the surgical area  · Temperature over 100.5  · Nausea and vomiting lasting longer than 4 hours or if unable to take medications  · Any signs of decreased circulation or nerve impairment to extremity: change in color, persistent  numbness, tingling, coldness or increase pain  · Any questions    COUGH AND DEEP BREATHE    Breathing deep and coughing are very important exercises to do after surgery. Deep breathing and coughing open the little air tubes and air sacks in your lungs. You take deep breaths every day. You may not even notice - it is just something you do when you sigh or yawn. It is a natural exercise you do to keep these air passages open. After surgery, take deep breaths and cough, on purpose. Coughing and deep breathing help prevent bronchitis and pneumonia after surgery. If you had chest or belly surgery, use a pillow as a \"hug buddy\" and hold it tightly to your chest or belly when you cough. DIRECTIONS:  6. Take 10 to 15 slow deep breaths every hour while awake. 7. Breathe in deeply, and hold it for 2 seconds. 8. Exhale slowly through puckered lips, like blowing up a balloon. 9. After every 4th or 5th deep breath, hug your pillow to your chest or belly and give a hard, deep cough. Yes, it will probably hurt. But doing this exercise is very important part of healing after surgery. Take your pain medicine to help you do this exercise without too much pain. IF YOU HAVE BEEN DIAGNOSED WITH SLEEP APNEA, PLEASE USE YOUR SLEEIFP APNEA DEVICE OR CPAP MACHINE WHEN YOU INTEND TO NAP AFTER TAKING PAIN MEDICATION. Ankle Pumps    Ankle pumps increase the circulation of oxygenated blood to your lower extremities and decrease your risk for circulation problems such as blood clots.  They also stretch the muscles, tendons and ligaments in your foot and ankle, and prevent joint contracture in the ankle and foot, especially after surgeries on the legs. It is important to do ankle pump exercises regularly after surgery because immobility increases your risk for developing a blood clot. Your doctor may also have you take an Aspirin for the next few days as well. If your doctor did not ask you to take an Aspirin, consult with him before starting Aspirin therapy on your own. Slowly point your foot forward, feeling the muscles on the top of your lower leg stretch, and hold this position for 5 seconds. Next, pull your foot back toward you as far as possible, stretching the calf muscles, and hold that position for 5 seconds. Repeat with the other foot. Perform 10 repetitions every hour while awake for both ankles if possible (down and then up with the foot once is one repetition). You should feel gentle stretching of the muscles in your lower leg when doing this exercise. If you feel pain, or your range of motion is limited, don't  Push too hard. Only go the limit your joint and muscles will let you go. If you have increasing pain, progressively worsening leg warmth or swelling, STOP the exercise and call your doctor. Below is information about the medications your doctor is prescribing after your visit:    Other information in your discharge envelope:  []     PRESCRIPTIONS  []     PHYSICAL THERAPY PRESCRIPTION  []     APPOINTMENT CARDS  []     [facility] Pamphlet for block or block with On-Q Catheter from An[facility]hesia Service  []     Medical device information sheets/pamphlets from their    []     School/work excuse note. []     /parent work excuse note. These are general instructions for a healthy lifestyle:    *  Please give a list of your current medications to your Primary Care Provider.   *  Please update this list whenever your medications are discontinued, doses are      changed, or new medications (including over-the-counter products) are added. *  Please carry medication information at all times in case of emergency situations. About Smoking  No smoking / No tobacco products / Avoid exposure to second hand smoke    Surgeon General's Warning:  Quitting smoking now greatly reduces serious risk to your health. Obesity, smoking, and sedentary lifestyle greatly increases your risk for illness and disease. A healthy diet, regular physical exercise & weight monitoring are important for maintaining a healthy lifestyle. Congestive Heart Failure  You may be retaining fluid if you have a history of heart failure or if you experience any of the following symptoms:  Weight gain of 3 pounds or more overnight or 5 pounds in a week, increased swelling in our hands or feet or shortness of breath while lying flat in bed. Please call your doctor as soon as you notice any of these symptoms; do not wait until your next office visit. Recognize signs and symptoms of STROKE:  F - face looks uneven  A - arms unable to move or move even  S - speech slurred or non-existent  T - time-call 911 as soon as signs and symptoms begin-DO NOT go         Back to bed or wait to see if you get better-TIME IS BRAIN. Warning signs of HEART ATTACK  Call 911 if you have these symptoms    · Chest discomfort. Most heart attacks involve discomfort in the center of the chest that lasts more than a few minutes, or that goes away and comes back. It can feel like uncomfortable pressure, squeezing, fullness, or pain. · Discomfort in other areas of the upper body. Symptoms can include pain or discomfort in one or both        Arms, the back, neck, jaw, or stomach. ·  Shortness of breath with or without chest discomfort. · Other signs may include breaking out in a cold sweat, nausea, or lightheadedness    Don't wait more than five minutes to call 911 - MINUTES MATTER!  Fast action can save your life.  Calling 911 is almost always the fastest way to get lifesaving treatment. Emergency Medical Services staff can begin treatment when they arrive - up to an hour sooner than if someone gets to the hospital by car. [facility] MEDICATION AND SIDE EFFECT GUIDE    [facility] MEDICATION AND SIDE EFFECT GUIDE was provided to the PATIENT AND CARE PROVIDER.   Information provided includes instruction about drug purpose and common side effects for the following medications:    ·

## 2017-05-26 NOTE — ANESTHESIA PREPROCEDURE EVALUATION
Anesthetic History   No history of anesthetic complications            Review of Systems / Medical History  Patient summary reviewed, nursing notes reviewed and pertinent labs reviewed    Pulmonary  Within defined limits                 Neuro/Psych   Within defined limits           Cardiovascular  Within defined limits                Exercise tolerance: >4 METS     GI/Hepatic/Renal  Within defined limits              Endo/Other  Within defined limits      Obesity     Other Findings              Physical Exam    Airway  Mallampati: II    Neck ROM: normal range of motion   Mouth opening: Normal     Cardiovascular  Regular rate and rhythm,  S1 and S2 normal,  no murmur, click, rub, or gallop  Rhythm: regular  Rate: normal         Dental  No notable dental hx       Pulmonary  Breath sounds clear to auscultation               Abdominal  GI exam deferred       Other Findings            Anesthetic Plan    ASA: 2  Anesthesia type: general          Induction: Intravenous  Anesthetic plan and risks discussed with: Patient

## 2017-05-27 NOTE — PROGRESS NOTES
GYN Visit Note          Chief Complaint: recurrent high grade MARLINE    HPI:    Orestes Langford  28 y.o. WF     LMP:    Discussed LEEP to remove abnormal tissue from cervix and reduce risk of cervical cancer    PAP History:  HGSIL and MARLINE II and III on biopsy    Review of Systems: -    General ROS: negative for - chills or fever  Respiratory ROS: no cough, shortness of breath, or wheezing  Cardiovascular ROS: no chest pain or dyspnea on exertion  Gastrointestinal ROS: no abdominal pain, change in bowel habits, or black or bloody stools  Genito-Urinary ROS: no dysuria, trouble voiding, or hematuria      OBJECTIVE:  Blood pressure 134/74, pulse 68, height 5' 10\" (1.778 m), weight 236 lb (107 kg), last menstrual period 2017, not currently breastfeeding. General appearance - alert, well appearing, and in no distress  Neck - supple, no significant adenopathy  Chest - clear to auscultation, no wheezes, rales or rhonchi, symmetric air entry  Heart - normal rate, regular rhythm, normal S1, S2, no murmurs, rubs, clicks or gallops  Abdomen - soft, nontender, nondistended, no masses or organomegaly  Pelvic - normal external genitalia, vulva, vagina, cervix, uterus and adnexa. Chaperoned by: Romeo Clinton RN     ASSESSMENT / PLAN:    [address] [place] [address]  was seen today for advice only. Diagnoses and all orders for this visit:    High grade squamous intraepithelial cervical dysplasia      Follow-up Disposition:  Return in about 1 week (around 2017) for LEEP under anesthesia. Allen Tellez MD, [address] [place] [address], Retired    Mendy Dooley, [address]

## 2017-06-07 ENCOUNTER — OFFICE VISIT (OUTPATIENT)
Dept: MIDWIFE SERVICES | Age: 32
End: 2017-06-07

## 2017-06-07 VITALS
WEIGHT: 236 LBS | DIASTOLIC BLOOD PRESSURE: 72 MMHG | SYSTOLIC BLOOD PRESSURE: 133 MMHG | BODY MASS INDEX: 33.79 KG/M2 | HEIGHT: 70 IN | HEART RATE: 64 BPM

## 2017-06-07 DIAGNOSIS — D06.9 ADENOCARCINOMA IN SITU OF CERVIX: ICD-10-CM

## 2017-06-07 DIAGNOSIS — Z48.816 ENCOUNTER FOR SURGICAL AFTERCARE FOLLOWING SURGERY OF GENITOURINARY SYSTEM: Primary | ICD-10-CM

## 2017-06-07 NOTE — PROGRESS NOTES
Chief Complaint   Patient presents with    Post OP Follow Up     2 weeks post op from LEEP     Visit Vitals    /72    Pulse 64    Ht 5' 10\" (1.778 m)    Wt 236 lb (107 kg)    BMI 33.86 kg/m2       1. Have you been to the ER, urgent care clinic since your last visit? Hospitalized since your last visit? No    2. Have you seen or consulted any other health care providers outside of the [address] [place] [address] since your last visit? Include any pap smears or colon screening. No     Here today for post op from LEEP.   Reports that she has no pain and never had any after the procedure

## 2017-06-12 ENCOUNTER — TELEPHONE (OUTPATIENT)
Dept: MIDWIFE SERVICES | Age: 32
End: 2017-06-12

## 2017-06-12 NOTE — TELEPHONE ENCOUNTER
Pt calling asking if Dr. Lyly Mello has spoken with another provider of hers to discuss results or a plan of action for her. Please call.

## 2017-06-12 NOTE — TELEPHONE ENCOUNTER
Responded to patient's [employer] message that dr Jimmy Barton is still awaiting to hear back from dr Hany Reid and that we will let her know something as soon as we find out.

## 2017-06-29 NOTE — PROGRESS NOTES
SURGICAL PROCEDURE Sherolyn Sandifer  :  1985  Age: 28 y.o. Surgical Procedure Description:   Robotic assisted laparoscopic total hysterectomy with bilateral salpingectomy, possible oophrectomy, possible open, and cystoscopy  CPT:[id number]        ICD-10-CM ICD-9-CM    1. Encounter for surgical aftercare following surgery of genitourinary system Z48.816 V58.76    2.  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  no  Pre-Admission Testing? no   PAT Dx Codes  Z01.810    Cardiology   no  Z01.811 Respiratory no  Z01.812 Laboratory yes  Z01.818 Other  no  Date and Time of PAT:___________________________________________________    SURGERY POSTED BY: _________________________________________________  Patient's Insurance Company:  ______________________________________________   Telephone #: ______________________________________________________   Member #      ______________________________________________________   Group #         ______________________________________________________   Authorization #  ____________________________________________________  Authorization per:  _______________________  Date & Time ________________  Patient notified:  Date ______________  Time:  _______________________  Packet given      Date ______________  On provider calendar:  _____________   Pre-certification notified  __________________  Authorization info entered into [employer]  ____________  By:  _______________________

## 2017-06-29 NOTE — PROGRESS NOTES
GYN Visit Note          Chief Complaint: Post op LEEP    HPI:    Jenni Console  28 y.o. WF     LMP:  here for follow up of LEEP under anesthesia which is reported below. I recommended she have a hysterectomy and felt that since there was no indication of deep invasion a simple hysterectomy was appropriate but that if she preferred I would refer her to gyn/onc for treatment. LEEP Report     SYNOPTIC REPORT   SPECIMEN   Specimen : Cervix   Procedure: Loop electrical excision procedure (LEEP)     TUMOR   Primary Tumor Site: Not specified   Tumor Size: < 0.7 mm   Histologic Type: Early microinvasive adenocarcinoma   Histologic Grade: G1   Tumor Extent   Depth of Invasion in Millimeters: <0.7 mm   Horizontal Extent in Millimeters: <0.5 mm   Accessory Tumor Findings   Lymph-Vascular Invasion: Not identified     MARGINS   Endocervical Margin: Uninvolved by invasive carcinoma   Status of Endocervical Margin Involvement by Squamous Intraepithelial Lesion: Involved by squamous intraepithelial lesion   Exocervical Margin: Uninvolved by invasive carcinoma   Status of Exocervical Margin Involvement by Squamous Intraepithelial Lesion: Uninvolved by squamous intraepithelial lesion   Deep Margin: Uninvolved by invasive carcinoma   Distance of Invasive Carcinoma from Margin: 1.5 mm     [employer] Patient Name: Jeffry Ahmadi Specimen #: [id number]   Patient Name: Jeffry Ahmadi Page 2 of 2 Printed: 17 3:33 PM SURGICAL PATHOLOGY REPORT   Specify Location: endocervical margin   Status of Deep Margin Involvement by Squamous Intraepithelial Lesion: Uninvolved by squamous intraepithelial lesion   Pathologic Staging   pT1a1   pNX   2. Endocervix, LEEP conization:   Benign endocervix.      Review of Systems: -    General ROS: negative for - chills, fever or malaise  Psychological ROS: negative for - anxiety or depression  Allergy and Immunology ROS: negative  negative for - REMINDER:DOCUMENT ALLERGIES IN ALLERGY ACTIVITY  Endocrine ROS: negative for - galactorrhea, hot flashes or mood swings  Breast ROS: negative for breast lumps  Respiratory ROS: no cough, shortness of breath, or wheezing  Cardiovascular ROS: no chest pain or dyspnea on exertion  Gastrointestinal ROS: no abdominal pain, change in bowel habits, or black or bloody stools  Genito-Urinary ROS: no dysuria, trouble voiding, or hematuria    Past, Family, Social History:    Patient Active Problem List    Diagnosis Date Noted    High grade squamous intraepithelial cervical dysplasia 02/03/2014    Reactive hypoglycemia 06/08/2011     Current Outpatient Prescriptions   Medication Sig Dispense Refill    drospirenone-ethinyl estradiol (MARY) 3-0.02 mg tab Take 1 Tab by mouth daily. Indications: Pregnancy Contraception 3 Package 4    ibuprofen (MOTRIN) 800 mg tablet Take 1 Tab by mouth every eight (8) hours as needed for Pain.  Indications: Pain 20 Tab 0     No Known Allergies  Past Medical History:   Diagnosis Date    Abnormal Pap smear     Dr. Sam Martinez, last normal 1/2010    AR (allergic rhinitis)     allergy resting normal per pt    Hypoglycemia 2004    Dr. Ferrera Chol     Past Surgical History:   Procedure Laterality Date    CONIZATION CERVIX,LOOP ELECTRD N/A 5/26/2017    LOOP ELECTRODE EXCISION PROCEDURE OF CERVIX (LEEP)/ CONIZATION performed by Joycelyn Kaur MD at [address] [facility] HX CHOLECYSTECTOMY  02/2014    HX HEENT      lasix    HX WISDOM TEETH EXTRACTION       Family History   Problem Relation Age of Onset    Cancer Maternal Grandmother      colon    MS Father     Diabetes Maternal Grandfather     Hypertension Maternal Grandfather     Hypertension Mother     High Cholesterol Mother      Social History   Substance Use Topics    Smoking status: Never Smoker    Smokeless tobacco: Never Used    Alcohol use Yes      Comment: 1 drink a month          OBJECTIVE:  Blood pressure 133/72, pulse 64, height 5' 10\" (1.778 m), weight 236 lb (107 kg), last menstrual period 05/22/2017, not currently breastfeeding. General appearance - alert, well appearing, and in no distress  Mental status - alert, oriented to person, place, and time, anxious  Neck - supple, no significant adenopathy, thyroid exam: thyroid is normal in size without nodules or tenderness  Lymphatics - no palpable lymphadenopathy, no hepatosplenomegaly  Chest - clear to auscultation, no wheezes, rales or rhonchi, symmetric air entry  Heart - normal rate, regular rhythm, normal S1, S2, no murmurs, rubs, clicks or gallops  Abdomen - soft, nontender, nondistended, no masses or organomegaly  Pelvic - normal external genitalia, vulva, vagina, cervix, uterus and adnexa  Rectal - deferred  Extremities - peripheral pulses normal, no pedal edema, no clubbing or cyanosis  Chaperoned by:  Yohannes Beach  RN     ASSESSMENT / PLAN:    Lexy Carrasco was seen today for post op follow up. Diagnoses and all orders for this visit:    Encounter for surgical aftercare following surgery of genitourinary system    Adenocarcinoma in situ of cervix      Follow-up Disposition: Not on File            Allen Masters MD, [address] [place] [address], Retired    Boubacar Dooley, [address]

## 2017-06-30 ENCOUNTER — OFFICE VISIT (OUTPATIENT)
Dept: MIDWIFE SERVICES | Age: 32
End: 2017-06-30

## 2017-06-30 VITALS
WEIGHT: 238 LBS | HEART RATE: 87 BPM | BODY MASS INDEX: 34.07 KG/M2 | SYSTOLIC BLOOD PRESSURE: 131 MMHG | HEIGHT: 70 IN | DIASTOLIC BLOOD PRESSURE: 69 MMHG

## 2017-06-30 DIAGNOSIS — C80.1: Primary | ICD-10-CM

## 2017-06-30 NOTE — PROGRESS NOTES
GYN Visit Note          Chief Complaint: does not want a hysterectomy    HPI:     Galina Jane  28 y.o. WF     LMP:  2017    Patient reports not wanting a hysterectomy at this time as she wants more children. Offered a CKC as an alternative and covered risk, possible cervical incompetence and need for frequent pap smears. i have also asked the advice of Gyn Onc (Dr. Sue Batista) as she will need close follow up due to my pending detention. Review of Systems: -    General ROS: negative for - chills, fever or malaise  Psychological ROS: positive for - anxiety  negative for - depression, disorientation, memory difficulties or mood swings  Cardiovascular ROS: no chest pain or dyspnea on exertion  Gastrointestinal ROS: no abdominal pain, change in bowel habits, or black or bloody stools  Genito-Urinary ROS: no dysuria, trouble voiding, or hematuria    Past, Family, Social History:    Patient Active Problem List    Diagnosis Date Noted    Squamous cell carcinoma, microinvasive ([facility] [address] Cervix 2017    Reactive hypoglycemia 2011     Current Outpatient Prescriptions   Medication Sig Dispense Refill    ibuprofen (MOTRIN) 800 mg tablet Take 1 Tab by mouth every eight (8) hours as needed for Pain. Indications: Pain 20 Tab 0    drospirenone-ethinyl estradiol (MARY) 3-0.02 mg tab Take 1 Tab by mouth daily.  Indications: Pregnancy Contraception 3 Package 4     No Known Allergies  Past Medical History:   Diagnosis Date    Abnormal Pap smear     Dr. Margo Rutherford, last normal 2010    Abnormal Papanicolaou smear of cervix     AR (allergic rhinitis)     allergy resting normal per pt    Hypoglycemia     Dr. Bo Nash     Past Surgical History:   Procedure Laterality Date    CONIZATION CERVIX,LOOP ELECTRD N/A 2017    LOOP ELECTRODE EXCISION PROCEDURE OF CERVIX (LEEP)/ CONIZATION performed by Nury Bell MD at [facility] [address] HX CHOLECYSTECTOMY  2014    HX HEENT      lasix    HX WISDOM TEETH EXTRACTION       Family History   Problem Relation Age of Onset    Cancer Maternal Grandmother      colon    MS Father     Diabetes Maternal Grandfather     Hypertension Maternal Grandfather     Hypertension Mother     High Cholesterol Mother      Social History   Substance Use Topics    Smoking status: Never Smoker    Smokeless tobacco: Never Used    Alcohol use Yes      Comment: 1 drink a month          OBJECTIVE:  Blood pressure 131/69, pulse 87, height 5' 10\" (1.778 m), weight 238 lb (108 kg), last menstrual period 06/11/2017, not currently breastfeeding. General appearance - alert, well appearing, and in no distress  Mental status - alert, oriented to person, place, and time  Abdomen - soft, nontender, nondistended, no masses or organomegaly       ASSESSMENT / PLAN:    Sylvester Zimmer was seen today for follow-up. Diagnoses and all orders for this visit:    Squamous cell carcinoma, microinvasive ([address]      Follow-up Disposition:  Return in about 6 weeks (around 8/11/2017) for pap and colpo. Procedures/Addenda   ADDENDUM Date Ordered: 6/30/2017 Status: Signed Out   Date Complete: 6/30/2017 By: Brad Salazar. Nolberto Car M.D. Date Reported: 6/30/2017   Addendum Comment   There is an error in the synoptic report in the category HISTOLOGIC TYPE. The correct histologic type is microinvasive squamous cell carcinoma, previously reported as microinvasive adenocarcinoma. The report is hereby corrected to read:   Histologic type: Microinvasive squamous cell carcinoma. Addendum Electronically Signed Out By Brad Salazar. Romeo Form, M.D. Alphia Lesch.  Teodora Munoz MD, [address] [place] [address], Retired    Elizabeth Dooley, [address] Medicine

## 2017-06-30 NOTE — PROGRESS NOTES
Chief Complaint   Patient presents with    Follow-up     Discuss surgery after abnormal pap     Visit Vitals    /69    Pulse 87    Ht 5' 10\" (1.778 m)    Wt 238 lb (108 kg)    LMP 06/11/2017    BMI 34.15 kg/m2     1. Have you been to the ER, urgent care clinic since your last visit? Hospitalized since your last visit? No    2. Have you seen or consulted any other health care providers outside of the [address] [place] [address] since your last visit? Include any pap smears or colon screening. No     Thank you for choosing [facility]. We know you have many options when it comes to your healthcare; we appreciate that you picked us. Our goal is to provide exceptional service and world class care for every patient. You may receive a survey in the mail or by email asking for your feedback. Please take a few minutes to share your thoughts about your recent visit. Your comments helps us understand what we do well and what we can do better. To ensure confidentiality, this survey is administered by an independent third-party, [employer] participation will help us to continue and improve the quality of care that we provide to you, your family, friends, and neighbors. Thank you!

## 2017-07-03 PROBLEM — C80.1: Status: ACTIVE | Noted: 2017-07-03

## 2018-04-29 DIAGNOSIS — R87.611 PAPANICOLAOU SMEAR OF CERVIX WITH ATYPICAL SQUAMOUS CELLS CANNOT EXCLUDE HIGH GRADE SQUAMOUS INTRAEPITHELIAL LESION (ASC-H): ICD-10-CM

## 2018-04-29 DIAGNOSIS — N92.0 EXCESSIVE OR FREQUENT MENSTRUATION: ICD-10-CM

## 2018-05-03 RX ORDER — DROSPIRENONE AND ETHINYL ESTRADIOL 0.02-3(28)
KIT ORAL
Qty: 84 TAB | Refills: 0 | OUTPATIENT
Start: 2018-05-03

## 2018-05-08 ENCOUNTER — TELEPHONE (OUTPATIENT)
Dept: OBGYN CLINIC | Age: 33
End: 2018-05-08

## 2018-05-08 DIAGNOSIS — N92.0 EXCESSIVE OR FREQUENT MENSTRUATION: ICD-10-CM

## 2018-05-08 DIAGNOSIS — R87.611 PAPANICOLAOU SMEAR OF CERVIX WITH ATYPICAL SQUAMOUS CELLS CANNOT EXCLUDE HIGH GRADE SQUAMOUS INTRAEPITHELIAL LESION (ASC-H): ICD-10-CM

## 2018-05-08 RX ORDER — DROSPIRENONE AND ETHINYL ESTRADIOL 0.02-3(28)
1 KIT ORAL DAILY
Qty: 1 PACKAGE | Refills: 0 | Status: SHIPPED | OUTPATIENT
Start: 2018-05-08 | End: 2018-05-23 | Stop reason: SDUPTHER

## 2018-05-23 ENCOUNTER — OFFICE VISIT (OUTPATIENT)
Dept: MIDWIFE SERVICES | Age: 33
End: 2018-05-23

## 2018-05-23 VITALS
HEIGHT: 70 IN | WEIGHT: 226.5 LBS | HEART RATE: 70 BPM | SYSTOLIC BLOOD PRESSURE: 122 MMHG | BODY MASS INDEX: 32.43 KG/M2 | DIASTOLIC BLOOD PRESSURE: 56 MMHG

## 2018-05-23 DIAGNOSIS — Z12.4 SCREENING FOR CERVICAL CANCER: ICD-10-CM

## 2018-05-23 DIAGNOSIS — R87.611 PAPANICOLAOU SMEAR OF CERVIX WITH ATYPICAL SQUAMOUS CELLS CANNOT EXCLUDE HIGH GRADE SQUAMOUS INTRAEPITHELIAL LESION (ASC-H): ICD-10-CM

## 2018-05-23 DIAGNOSIS — D06.9 CARCINOMA IN SITU OF CERVIX, UNSPECIFIED LOCATION: Primary | ICD-10-CM

## 2018-05-23 DIAGNOSIS — N92.0 EXCESSIVE OR FREQUENT MENSTRUATION: ICD-10-CM

## 2018-05-23 RX ORDER — DROSPIRENONE AND ETHINYL ESTRADIOL 0.02-3(28)
1 KIT ORAL DAILY
Qty: 1 PACKAGE | Refills: 0 | Status: SHIPPED | OUTPATIENT
Start: 2018-05-23 | End: 2018-06-30 | Stop reason: SDUPTHER

## 2018-05-23 NOTE — PATIENT INSTRUCTIONS
Thank you for choosing [address]. We know you have many options when it comes to your healthcare; we appreciate that you picked us. Our goal is to provide exceptional service and world class care for every patient. You may receive a survey in the mail or by email asking for your feedback. Please take a few minutes to share your thoughts about your recent visit. Your comments helps us understand what we do well and what we can do better. To ensure confidentiality, this survey is administered by an independent third-party, [employer] participation will help us to continue and improve the quality of care that we provide to you, your family, friends, and neighbors. Thank you! Well Visit, Ages 25 to 48: Care Instructions  Your Care Instructions    Physical exams can help you stay healthy. Your doctor has checked your overall health and may have suggested ways to take good care of yourself. He or she also may have recommended tests. At home, you can help prevent illness with healthy eating, regular exercise, and other steps. Follow-up care is a key part of your treatment and safety. Be sure to make and go to all appointments, and call your doctor if you are having problems. It's also a good idea to know your test results and keep a list of the medicines you take. How can you care for yourself at home? · Reach and stay at a healthy weight. This will lower your risk for many problems, such as obesity, diabetes, heart disease, and high blood pressure. · Get at least 30 minutes of physical activity on most days of the week. Walking is a good choice. You also may want to do other activities, such as running, swimming, cycling, or playing tennis or team sports. Discuss any changes in your exercise program with your doctor. · Do not smoke or allow others to smoke around you. If you need help quitting, talk to your doctor about stop-smoking programs and medicines. These can increase your chances of quitting for good. · Talk to your doctor about whether you have any risk factors for sexually transmitted infections (STIs). Having one sex partner (who does not have STIs and does not have sex with anyone else) is a good way to avoid these infections. · Use birth control if you do not want to have children at this time. Talk with your doctor about the choices available and what might be best for you. · Protect your skin from too much sun. When you're outdoors from 10 a.m. to 4 p.m., stay in the shade or cover up with clothing and a hat with a wide brim. Wear sunglasses that block UV rays. Even when it's cloudy, put broad-spectrum sunscreen (SPF 30 or higher) on any exposed skin. · See a dentist one or two times a year for checkups and to have your teeth cleaned. · Wear a seat belt in the car. · Drink alcohol in moderation, if at all. That means no more than 2 drinks a day for men and 1 drink a day for women. Follow your doctor's advice about when to have certain tests. These tests can spot problems early. For everyone  · Cholesterol. Have the fat (cholesterol) in your blood tested after age 21. Your doctor will tell you how often to have this done based on your age, family history, or other things that can increase your risk for heart disease. · Blood pressure. Have your blood pressure checked during a routine doctor visit. Your doctor will tell you how often to check your blood pressure based on your age, your blood pressure results, and other factors. · Vision. Talk with your doctor about how often to have a glaucoma test.  · Diabetes. Ask your doctor whether you should have tests for diabetes. · Colon cancer. Have a test for colon cancer at age 48. You may have one of several tests. If you are younger than 48, you may need a test earlier if you have any risk factors.  Risk factors include whether you already had a precancerous polyp removed from your colon or whether your parent, brother, sister, or child has had colon cancer. For women  · Breast exam and mammogram. Talk to your doctor about when you should have a clinical breast exam and a mammogram. Medical experts differ on whether and how often women under 50 should have these tests. Your doctor can help you decide what is right for you. · Pap test and pelvic exam. Begin Pap tests at age 24. A Pap test is the best way to find cervical cancer. The test often is part of a pelvic exam. Ask how often to have this test.  · Tests for sexually transmitted infections (STIs). Ask whether you should have tests for STIs. You may be at risk if you have sex with more than one person, especially if your partners do not wear condoms. For men  · Tests for sexually transmitted infections (STIs). Ask whether you should have tests for STIs. You may be at risk if you have sex with more than one person, especially if you do not wear a condom. · Testicular cancer exam. Ask your doctor whether you should check your testicles regularly. · Prostate exam. Talk to your doctor about whether you should have a blood test (called a PSA test) for prostate cancer. Experts differ on whether and when men should have this test. Some experts suggest it if you are older than 39 and are -American or have a father or brother who got prostate cancer when he was younger than 72. When should you call for help? Watch closely for changes in your health, and be sure to contact your doctor if you have any problems or symptoms that concern you. Where can you learn more? Go to http://vivian-leonardo.info/. Enter P072 in the search box to learn more about \"Well Visit, Ages 25 to 48: Care Instructions. \"  Current as of: May 12, 2017  Content Version: 11.4  © 4492-0976 Healthwise, Inc[employer]. Care instructions adapted under license by [employer] (which disclaims liability or warranty for this information).  If you have questions about a medical condition or this instruction, always ask your healthcare professional. [address] any warranty or liability for your use of this information. Abnormal Pap Test: Care Instructions  Your Care Instructions    A Pap test (also called a Pap smear) is used to look for early changes that may become cancer of the cervix. Your Pap test was abnormal. That may mean that some cells in your cervix have changed. The cell changes are most often caused by human papillomavirus (HPV) infection. An abnormal Pap test does not mean that the abnormal cells will lead to cancer. Changes in cervical cells may go away on their own or may progress slowly. Your doctor may want to repeat the Pap test or have you take other tests to see if you have cell changes. It is very important that you have regular Pap tests after you've had an abnormal Pap test.  Follow-up care is a key part of your treatment and safety. Be sure to make and go to all appointments, and call your doctor if you are having problems. It's also a good idea to know your test results and keep a list of the medicines you take. How can you care for yourself at home? · Do not smoke. Smoking may increase your risk for cervical cell changes. If you need help quitting, talk to your doctor about stop-smoking programs and medicines. These can increase your chances of quitting for good. · Be sure to get follow-up Pap tests or other follow-up tests as recommended by your doctor. When should you call for help? Watch closely for changes in your health, and be sure to contact your doctor if you have any problems. Where can you learn more? Go to http://vivian-leonardo.info/. Enter P925 in the search box to learn more about \"Abnormal Pap Test: Care Instructions. \"  Current as of: May 12, 2017  Content Version: 11.4  © 6672-6438 Healthwise, [employer].  Care instructions adapted under license by [employer] (which disclaims liability or warranty for this information). If you have questions about a medical condition or this instruction, always ask your healthcare professional. [address] any warranty or liability for your use of this information.

## 2018-05-23 NOTE — MR AVS SNAPSHOT
[address] [address] 1007 [facility] 
[phone] Patient: Prudence Felty MRN: [id number] Visit Information Date & Time Provider Department Dept. Phone Encounter #  
 2018  9:00 AM [facility] [address] [id number] Upcoming Health Maintenance Date Due Influenza Age 5 to Adult 2018 PAP AKA CERVICAL CYTOLOGY 2020 Allergies as of 2018  Review Complete On: 2018 By: Renetta Davis RN No Known Allergies Current Immunizations  Reviewed on 12/3/2013 Name Date Hepatitis B Vaccine 2011, 3/14/2011, 2011 MMR Vaccine 3/18/1996, 1986 Meningococcal Vaccine 2003 PPD 2011 TD Vaccine 2005, 1985, 1985 TDAP Vaccine 2000 Td, Adsorbed PF 12/3/2013 Not reviewed this visit You Were Diagnosed With   
  
 Codes Comments Carcinoma in situ of cervix, unspecified location    -  Primary ICD-10-CM: D06.9 ICD-9-CM: 233.1 Vitals BP Pulse Height(growth percentile) Weight(growth percentile) LMP BMI  
 122/56 70 5' 10\" (1.778 m) 226 lb 8 oz (102.7 kg) 2018 (Approximate) 32.5 kg/m2 OB Status Smoking Status Having regular periods Never Smoker BMI and BSA Data Body Mass Index Body Surface Area 32.5 kg/m 2 2.25 m 2 Preferred Pharmacy Pharmacy Name Phone [address] [facility] AT [address] [place] [address]. [phone] Your Updated Medication List  
  
   
This list is accurate as of 18 10:09 AM.  Always use your most recent med list.  
  
  
  
  
 drospirenone-ethinyl estradiol 3-0.02 mg Tab Commonly known as:  MARY Take 1 Tab by mouth daily. Indications: Pregnancy Contraception  
  
 ibuprofen 800 mg tablet Commonly known as:  MOTRIN  
 Take 1 Tab by mouth every eight (8) hours as needed for Pain. Indications: Pain We Performed the Following REFERRAL TO GYN ONCOLOGY [XSL88 Custom] Referral Information Referral ID Referred By Referred To  
  
 [id number] Vanda Díaz MD   
   [address] [facility] [address] [place] [address] Phone: [phone] Fax: [phone] Visits Status Start Date End Date 1 New Request 5/23/18 5/23/19 If your referral has a status of pending review or denied, additional information will be sent to support the outcome of this decision. Patient Instructions Thank you for choosing [facility]. We know you have many options when it comes to your healthcare; we appreciate that you picked us. Our goal is to provide exceptional service and world class care for every patient. You may receive a survey in the mail or by email asking for your feedback. Please take a few minutes to share your thoughts about your recent visit. Your comments helps us understand what we do well and what we can do better. To ensure confidentiality, this survey is administered by an independent third-party, PaeDae Banks participation will help us to continue and improve the quality of care that we provide to you, your family, friends, and neighbors. Thank you! Well Visit, Ages 25 to 48: Care Instructions Your Care Instructions Physical exams can help you stay healthy. Your doctor has checked your overall health and may have suggested ways to take good care of yourself. He or she also may have recommended tests. At home, you can help prevent illness with healthy eating, regular exercise, and other steps. Follow-up care is a key part of your treatment and safety.  Be sure to make and go to all appointments, and call your doctor if you are having problems. It's also a good idea to know your test results and keep a list of the medicines you take. How can you care for yourself at home? · Reach and stay at a healthy weight. This will lower your risk for many problems, such as obesity, diabetes, heart disease, and high blood pressure. · Get at least 30 minutes of physical activity on most days of the week. Walking is a good choice. You also may want to do other activities, such as running, swimming, cycling, or playing tennis or team sports. Discuss any changes in your exercise program with your doctor. · Do not smoke or allow others to smoke around you. If you need help quitting, talk to your doctor about stop-smoking programs and medicines. These can increase your chances of quitting for good. · Talk to your doctor about whether you have any risk factors for sexually transmitted infections (STIs). Having one sex partner (who does not have STIs and does not have sex with anyone else) is a good way to avoid these infections. · Use birth control if you do not want to have children at this time. Talk with your doctor about the choices available and what might be best for you. · Protect your skin from too much sun. When you're outdoors from 10 a.m. to 4 p.m., stay in the shade or cover up with clothing and a hat with a wide brim. Wear sunglasses that block UV rays. Even when it's cloudy, put broad-spectrum sunscreen (SPF 30 or higher) on any exposed skin. · See a dentist one or two times a year for checkups and to have your teeth cleaned. · Wear a seat belt in the car. · Drink alcohol in moderation, if at all. That means no more than 2 drinks a day for men and 1 drink a day for women. Follow your doctor's advice about when to have certain tests. These tests can spot problems early. For everyone · Cholesterol. Have the fat (cholesterol) in your blood tested after age 21.  Your doctor will tell you how often to have this done based on your age, family history, or other things that can increase your risk for heart disease. · Blood pressure. Have your blood pressure checked during a routine doctor visit. Your doctor will tell you how often to check your blood pressure based on your age, your blood pressure results, and other factors. · Vision. Talk with your doctor about how often to have a glaucoma test. 
· Diabetes. Ask your doctor whether you should have tests for diabetes. · Colon cancer. Have a test for colon cancer at age 48. You may have one of several tests. If you are younger than 48, you may need a test earlier if you have any risk factors. Risk factors include whether you already had a precancerous polyp removed from your colon or whether your parent, brother, sister, or child has had colon cancer. For women · Breast exam and mammogram. Talk to your doctor about when you should have a clinical breast exam and a mammogram. Medical experts differ on whether and how often women under 50 should have these tests. Your doctor can help you decide what is right for you. · Pap test and pelvic exam. Begin Pap tests at age 24. A Pap test is the best way to find cervical cancer. The test often is part of a pelvic exam. Ask how often to have this test. 
· Tests for sexually transmitted infections (STIs). Ask whether you should have tests for STIs. You may be at risk if you have sex with more than one person, especially if your partners do not wear condoms. For men · Tests for sexually transmitted infections (STIs). Ask whether you should have tests for STIs. You may be at risk if you have sex with more than one person, especially if you do not wear a condom. · Testicular cancer exam. Ask your doctor whether you should check your testicles regularly. · Prostate exam. Talk to your doctor about whether you should have a blood test (called a PSA test) for prostate cancer.  Experts differ on whether and when men should have this test. Some experts suggest it if you are older than 39 and are -American or have a father or brother who got prostate cancer when he was younger than 72. When should you call for help? Watch closely for changes in your health, and be sure to contact your doctor if you have any problems or symptoms that concern you. Where can you learn more? Go to http://vivian-leonardo.info/. Enter P072 in the search box to learn more about \"Well Visit, Ages 25 to 48: Care Instructions. \" Current as of: May 12, 2017 Content Version: 11.4 © 6366-5706 Lucibel. Care instructions adapted under license by [employer] (which disclaims liability or warranty for this information). If you have questions about a medical condition or this instruction, always ask your healthcare professional. [address] any warranty or liability for your use of this information. Abnormal Pap Test: Care Instructions Your Care Instructions A Pap test (also called a Pap smear) is used to look for early changes that may become cancer of the cervix. Your Pap test was abnormal. That may mean that some cells in your cervix have changed. The cell changes are most often caused by human papillomavirus (HPV) infection. An abnormal Pap test does not mean that the abnormal cells will lead to cancer. Changes in cervical cells may go away on their own or may progress slowly. Your doctor may want to repeat the Pap test or have you take other tests to see if you have cell changes. It is very important that you have regular Pap tests after you've had an abnormal Pap test. 
Follow-up care is a key part of your treatment and safety. Be sure to make and go to all appointments, and call your doctor if you are having problems. It's also a good idea to know your test results and keep a list of the medicines you take. How can you care for yourself at home? · Do not smoke. Smoking may increase your risk for cervical cell changes. If you need help quitting, talk to your doctor about stop-smoking programs and medicines. These can increase your chances of quitting for good. · Be sure to get follow-up Pap tests or other follow-up tests as recommended by your doctor. When should you call for help? Watch closely for changes in your health, and be sure to contact your doctor if you have any problems. Where can you learn more? Go to http://vivian-leonardo.info/. Enter P925 in the search box to learn more about \"Abnormal Pap Test: Care Instructions. \" Current as of: May 12, 2017 Content Version: 11.4 © 0223-1098 Open CS. Care instructions adapted under license by [employer] (which disclaims liability or warranty for this information). If you have questions about a medical condition or this instruction, always ask your healthcare professional. [address] [address] any warranty or liability for your use of this information. Introducing [facility] & HEALTH SERVICES! Dear Jewels Allan: Thank you for requesting a [employer] account. Our records indicate that you already have an active [employer] account. You can access your account anytime at [email or web address] "[email or web address]"/[email or web address] Did you know that you can access your hospital and ER discharge instructions at any time in [employer]? You can also review all of your test results from your hospital stay or ER visit. Additional Information If you have questions, please visit the Frequently Asked Questions section of the [employer] website at [email or web address] Remember, [employer] is NOT to be used for urgent needs. For medical emergencies, dial 911. Now available from your iPhone and Android! Please provide this summary of care documentation to your next provider. Your primary care clinician is listed as Jen Patterson  If you have any questions after today's visit, please call [phone].

## 2018-05-23 NOTE — PROGRESS NOTES
Annual exam ages 21-44    Maria L Pace is a ,  35 y.o. female [facility][note 3] Patient's last menstrual period was 2018 (approximate). .    She presents for her annual checkup. She is having no significant problems. With regard to follow up for her squamous cell carcinoma she never went to heme/onc or followed up with 6 wk post leep pap and colposcopy. She would like a refill on her OCP      Menstrual status:    Her periods are light in flow. She is using three to five pads or tampons per day, usually regular and last 26-30 days. She denies dysmenorrhea. She reports no premenstrual symptoms. Contraception:    The current method of family planning is OCP (estrogen/progesterone). Sexual history:     She  reports that she currently engages in sexual activity and has had male partners. She reports using the following method of birth control/protection: Pill. .    Medical conditions:    Since her last annual GYN exam about one year ago, she has not the following changes in her health history: none. Pap and Mammogram History:    Her most recent Pap smear was see report, obtained 1 year(s) ago. The patient has never had a mammogram.    Breast Cancer History/Substance Abuse: negative    Past Medical History:   Diagnosis Date    Abnormal Pap smear     Dr. Tung Sadler, last normal 2010    Abnormal Papanicolaou smear of cervix     AR (allergic rhinitis)     allergy resting normal per pt    Hypoglycemia     Dr. Lory Gonzalez     Past Surgical History:   Procedure Laterality Date    HX CHOLECYSTECTOMY  2014    HX HEENT      lasix    HX WISDOM TEETH EXTRACTION      MA CONIZATION CERVIX,LOOP ELECTRD N/A 2017    LOOP ELECTRODE EXCISION PROCEDURE OF CERVIX (LEEP)/ CONIZATION performed by Mehnaz Tapia MD at [facility] MAIN OR       Current Outpatient Prescriptions   Medication Sig Dispense Refill    drospirenone-ethinyl estradiol (MARY) 3-0.02 mg tab Take 1 Tab by mouth daily. Indications: Pregnancy Contraception 1 Package 0    ibuprofen (MOTRIN) 800 mg tablet Take 1 Tab by mouth every eight (8) hours as needed for Pain. Indications: Pain 20 Tab 0     Allergies: Review of patient's allergies indicates no known allergies. Tobacco History:  reports that she has never smoked. She has never used smokeless tobacco.  Alcohol Abuse:  reports that she drinks alcohol. Drug Abuse:  reports that she does not use illicit drugs.     Family Medical/Cancer History:   Family History   Problem Relation Age of Onset    Cancer Maternal Grandmother      colon    MS Father     Diabetes Maternal Grandfather     Hypertension Maternal Grandfather     Hypertension Mother     High Cholesterol Mother         Review of Systems - History obtained from the patient  Constitutional: negative for weight loss, fever, night sweats  HEENT: negative for hearing loss, earache, congestion, snoring, sorethroat  CV: negative for chest pain, palpitations, edema  Resp: negative for cough, shortness of breath, wheezing  GI: negative for change in bowel habits, abdominal pain, black or bloody stools  : negative for frequency, dysuria, hematuria, vaginal discharge  MSK: negative for back pain, joint pain, muscle pain  Breast: negative for breast lumps, nipple discharge, galactorrhea  Skin :negative for itching, rash, hives  Neuro: negative for dizziness, headache, confusion, weakness  Psych: negative for anxiety, depression, change in mood  Heme/lymph: negative for bleeding, bruising, pallor    Physical Exam    Visit Vitals    /56    Pulse 70    Ht 5' 10\" (1.778 m)    Wt 226 lb 8 oz (102.7 kg)    LMP 04/27/2018 (Approximate)    BMI 32.5 kg/m2       Constitutional  · Appearance: well-nourished, well developed, alert, in no acute distress    HENT  · Head and Face: appears normal    Neck  · Inspection/Palpation: normal appearance, no masses or tenderness  · Lymph Nodes: no lymphadenopathy present  · Thyroid: gland size normal, nontender, no nodules or masses present on palpation    Chest  · Respiratory Effort: breathing unlabored  · Auscultation: normal breath sounds    Cardiovascular  · Heart:  · Auscultation: regular rate and rhythm without murmur    Breasts  · Inspection of Breasts: breasts symmetrical, no skin changes, no discharge present, nipple appearance normal, no skin retraction present  · Palpation of Breasts and Axillae: no masses present on palpation, no breast tenderness  · Axillary Lymph Nodes: no lymphadenopathy present    Gastrointestinal  · Abdominal Examination: abdomen non-tender to palpation, normal bowel sounds, no masses present  · Liver and spleen: no hepatomegaly present, spleen not palpable  · Hernias: no hernias identified    Genitourinary  · External Genitalia: normal appearance for age, no discharge present, no tenderness present, no inflammatory lesions present, no masses present, no atrophy present  · Vagina: normal vaginal vault without central or paravaginal defects, no discharge present, no inflammatory lesions present, no masses present  · Bladder: non-tender to palpation  · Urethra: appears normal  · Cervix: normal   · Uterus: normal size, shape and consistency  · Adnexa: no adnexal tenderness present, no adnexal masses present  · Perineum: perineum within normal limits, no evidence of trauma, no rashes or skin lesions present  · Anus: anus within normal limits, no hemorrhoids present  · Inguinal Lymph Nodes: no lymphadenopathy present    Skin  · General Inspection: no rash, no lesions identified    Neurologic/Psychiatric  · Mental Status:  · Orientation: grossly oriented to person, place and time  · Mood and Affect: mood normal, affect appropriate    . Assessment:  Routine gynecologic examination  Her current medical status is satisfactory with no evidence of significant gynecologic issues.     Plan:  Counseled re: diet, exercise, healthy lifestyle  Consulted with Dr Andrew Mcintosh regarding proper follow up. She will be the 'point person' for her MD care. She recommends hematology/oncology referral stat which we ordered today.     Pt counseled regarding co-testing for high risk HPV with pap  Rec screening mammo at either 35 or 40

## 2018-05-23 NOTE — PROGRESS NOTES
Chief Complaint   Patient presents with    Well Woman     Visit Vitals    /56    Pulse 70    Ht 5' 10\" (1.778 m)    Wt 226 lb 8 oz (102.7 kg)    LMP 04/27/2018 (Approximate)    BMI 32.5 kg/m2     1. Have you been to the ER, urgent care clinic since your last visit? Hospitalized since your last visit? No    2. Have you seen or consulted any other health care providers outside of the [address] [place] [address] since your last visit? Include any pap smears or colon screening.  No     Here today for well woman exam.      Verbal order for referral to gyn/onc dr Ezequiel Correia per ji rees

## 2018-05-30 LAB
CYTOLOGIST CVX/VAG CYTO: NORMAL
CYTOLOGY CVX/VAG DOC THIN PREP: NORMAL
DX ICD CODE: NORMAL
HPV I/H RISK 1 DNA CVX QL PROBE+SIG AMP: NEGATIVE
Lab: NORMAL
Lab: NORMAL
OTHER STN SPEC: NORMAL
PATH REPORT.FINAL DX SPEC: NORMAL
STAT OF ADQ CVX/VAG CYTO-IMP: NORMAL

## 2018-06-02 ENCOUNTER — HOSPITAL ENCOUNTER (EMERGENCY)
Age: 33
Discharge: HOME | End: 2018-06-02
Attending: EMERGENCY MEDICINE
Payer: COMMERCIAL

## 2018-06-02 VITALS
DIASTOLIC BLOOD PRESSURE: 67 MMHG | TEMPERATURE: 97.9 F | SYSTOLIC BLOOD PRESSURE: 127 MMHG | HEIGHT: 70 IN | WEIGHT: 222 LBS | BODY MASS INDEX: 31.78 KG/M2 | RESPIRATION RATE: 18 BRPM | OXYGEN SATURATION: 98 % | HEART RATE: 74 BPM

## 2018-06-02 DIAGNOSIS — Z11.1 ENCOUNTER FOR TB TINE TEST: Primary | ICD-10-CM

## 2018-06-02 PROCEDURE — 99282 EMERGENCY DEPT VISIT SF MDM: CPT

## 2018-06-02 NOTE — DISCHARGE INSTRUCTIONS
Tuberculin Skin Test: Care Instructions  Your Care Instructions    Tuberculosis (TB) is a bacterial infection that can damage the lungs or other parts of the body. The TB skin test can tell if you have TB bacteria in your body. Many people are exposed to TB and test positive for TB bacteria in their bodies, but they don't get the disease. TB bacteria can stay in your body without making you sick. This is because your immune system can keep TB in check. Your doctor may want you to have a TB skin test if you have been in close contact with someone who has TB. Or you may need the test if you have symptoms that might be caused by TB, such as a cough that does not go away, a fever, or weight loss. You also may get the test if you are a health care worker. During the skin test, part of a TB bacterium is injected under your skin. The test will feel like a skin prick. If you have TB bacteria in your body, a firm red bump will form at the shot site within 2 days. If the test shows that you are infected with TB (positive), your doctor probably will order more tests. A TB-positive skin test can't tell when you became infected with TB. And it can't tell whether the infection can be passed to others. Follow-up care is a key part of your treatment and safety. Be sure to make and go to all appointments, and call your doctor if you are having problems. It's also a good idea to know your test results and keep a list of the medicines you take. How can you care for yourself at home? · Do not scratch the test site. Scratching it may cause redness or swelling. This could affect the test results. · To ease itching, put a cold washcloth on the site. Then pat the site dry. · Do not cover the test site with a bandage or other dressing. · Go back to your doctor's office or hospital to have the test read on the follow-up date. This must be done between 48 and 72 hours after you get the shot. When should you call for help?   Watch closely for changes in your health, and be sure to contact your doctor if you have any problems. Where can you learn more? Go to http://vivian-leonardo.info/. Enter [phone] in the search box to learn more about \"Tuberculin Skin Test: Care Instructions. \"  Current as of: March 3, 2017  Content Version: 11.4  © 5091-7795 "2,10E+07". Care instructions adapted under license by [facility] (which disclaims liability or warranty for this information). If you have questions about a medical condition or this instruction, always ask your healthcare professional. [address] any warranty or liability for your use of this information. Thank you! Thank you for allowing us to provide you with excellent care today. We hope we addressed all of your concerns and needs. We strive to provide excellent quality care in the Emergency Department. You may receive a survey after your visit to evaluate the care you were provided. Should you receive a survey from us, we invite you to share your experience and tell us what made it excellent. It was a pleasure serving you, we invite you to share your experience with us, in our pursuit for excellence, should you be selected to receive a survey. If you feel that you have not received excellent quality care or timely care, please ask to speak to the nurse manager. Please choose us in the future for your continued health care needs. ------------------------------------------------------------------------------------------------------------  The exam and treatment you received in the Emergency Department were for an urgent problem and are not intended as complete care. It is important that you follow up with a doctor, nurse practitioner, or physician assistant for ongoing care.  If your symptoms become worse or you do not improve as expected and you are unable to reach your usual health care provider, you should return to the Emergency Department. We are available 24 hours a day. Please take your discharge instructions with you when you go to your follow-up appointment. If you have any problem arranging a follow-up appointment, contact the Emergency Department immediately. If a prescription has been provided, please have it filled as soon as possible to prevent a delay in treatment. Read the entire medication instruction sheet provided to you by the pharmacy. If you have any questions or reservations about taking the medication due to side effects or interactions with other medications, please call your primary care physician or contact the ER to speak with the charge nurse. Make an appointment with your family doctor or the physician you were referred to for follow-up of this visit as instructed on your discharge paperwork, as this is mandatory follow-up. Return to the ER if you are unable to be seen or if you are unable to be seen in a timely manner. If you have any problem arranging the follow-up visit, contact the Emergency Department immediately.

## 2018-06-02 NOTE — ED NOTES
Paperwork faxed to [phone] Colby Gary (read and signed by physician at 21 [phone] 6/2/18), original returned to patient with fax confirmation letter, copy made for chart

## 2018-06-02 NOTE — ED PROVIDER NOTES
EMERGENCY DEPARTMENT HISTORY AND PHYSICAL EXAM      Date: 6/2/2018  Patient Name: Yuli Escoto    History of Presenting Illness     Chief Complaint   Patient presents with    Other     Patient needs to have TB test read for work     History Provided By: Patient    HPI: Yuli Escoto, 35 y.o. female with PMHx significant for hypoglycemia, presents ambulatory to the ED for a TB reading on left arm . Pt endorses to receiving a TB shot yesterday for work and was lead to the ED today for result. She specifically denies any fevers, chills, nausea, HA, or diarrhea. As there are neither physical symptoms nor complaints of pain at the time of evaluation, there is no timing, duration, severity, location, associated symptoms, or modifying factors to report. Chief Complaint: TB reading  Duration: 2 days  Timing:  N/A  Location: left arm  Quality: N/A  Severity: N/A  Modifying Factors: denies any modifying factors  Associated Symptoms: N/A    There are no other complaints, changes, or physical findings at this time. SHx:(-)smoker; (-) EtOH use; (-)illicit drug use  FHx: HTN   PCP: Jamal Champagne MD    Current Outpatient Prescriptions   Medication Sig Dispense Refill    drospirenone-ethinyl estradiol (MARY) 3-0.02 mg tab Take 1 Tab by mouth daily. Indications: Pregnancy Contraception 1 Package 0    ibuprofen (MOTRIN) 800 mg tablet Take 1 Tab by mouth every eight (8) hours as needed for Pain.  Indications: Pain 20 Tab 0       Past History     Past Medical History:  Past Medical History:   Diagnosis Date    Abnormal Pap smear     Dr. Ricky Craft, last normal 1/2010    Abnormal Papanicolaou smear of cervix     AR (allergic rhinitis)     allergy resting normal per pt    Hypoglycemia 2004    Dr. Rene Tirado       Past Surgical History:  Past Surgical History:   Procedure Laterality Date    HX CHOLECYSTECTOMY  02/2014    HX HEENT      lasix    HX WISDOM TEETH EXTRACTION      DC CONIZATION CERVIX,LOOP ELECTRD N/A 5/26/2017 LOOP ELECTRODE EXCISION PROCEDURE OF CERVIX (LEEP)/ CONIZATION performed by Mehnaz Tapia MD at [facility] MAIN OR       Family History:  Family History   Problem Relation Age of Onset    Cancer Maternal Grandmother      colon    MS Father     Diabetes Maternal Grandfather     Hypertension Maternal Grandfather     Hypertension Mother     High Cholesterol Mother        Social History:  Social History   Substance Use Topics    Smoking status: Never Smoker    Smokeless tobacco: Never Used    Alcohol use Yes      Comment: 1 drink a month       Allergies:  No Known Allergies      Review of Systems   Review of Systems   Constitutional: Negative for chills and fever. HENT: Negative for congestion. Eyes: Negative. Respiratory: Negative for shortness of breath. Cardiovascular: Negative for chest pain. Gastrointestinal: Negative for abdominal pain, diarrhea, nausea and vomiting. Endocrine: Negative for heat intolerance. Genitourinary: Negative for dysuria. Musculoskeletal: Negative for back pain. Skin: Negative for rash. Allergic/Immunologic: Negative for immunocompromised state. Hematological: Does not bruise/bleed easily. Psychiatric/Behavioral: Negative. All other systems reviewed and are negative. Physical Exam   Physical Exam   Constitutional: She is oriented to person, place, and time. She appears well-developed and well-nourished. No distress. HENT:   Head: Normocephalic. Eyes: EOM are normal. Pupils are equal, round, and reactive to light. Neck: Normal range of motion. Neck supple. Cardiovascular: Normal rate, regular rhythm and intact distal pulses. Pulmonary/Chest: Effort normal and breath sounds normal.   Abdominal: Soft. Bowel sounds are normal. There is no tenderness. Musculoskeletal: Normal range of motion. Neurological: She is alert and oriented to person, place, and time. No cranial nerve deficit. Skin: Skin is warm and dry.    Notice of recent TB in left arm  No erythema or induration   Psychiatric: She has a normal mood and affect. Her behavior is normal.   Nursing note and vitals reviewed. Diagnostic Study Results     Labs -   No results found for this or any previous visit (from the past 12 hour(s)). Medical Decision Making   I am the first provider for this patient. I reviewed the vital signs, available nursing notes, past medical history, past surgical history, family history and social history. Vital Signs-Reviewed the patient's vital signs. Patient Vitals for the past 12 hrs:   Temp Pulse Resp BP SpO2   06/02/18 0823 97.9 °F (36.6 °C) 74 18 127/67 98 %     Pulse Oximetry Analysis - 98% on RA    Records Reviewed: Nursing Notes, Old Medical Records and Previous Laboratory Studies    Provider Notes (Medical Decision Making):   Negative TB test    ED Course:   Initial assessment performed. The patients presenting problems have been discussed, and they are in agreement with the care plan formulated and outlined with them. I have encouraged them to ask questions as they arise throughout their visit. Critical Care Time: 0    Disposition:  Discharge Note:  8:44 AM  The pt is ready for discharge. The pt's signs, symptoms, diagnosis, and discharge instructions have been discussed and pt has conveyed their understanding. The pt is to follow up as recommended or return to ER should their symptoms worsen. Plan has been discussed and pt is in agreement. PLAN:  1. Discharge Medication List as of 6/2/2018  8:44 AM        2. Follow-up Information     Follow up With Details Comments MD Radha  As needed [address] [facility]  [phone]      [facility] EMERGENCY DEPT  If symptoms worsen [address] [facility] [address]  [phone]        Return to ED if worse     Diagnosis     Clinical Impression:   1.  Encounter for TB giselle test Attestations: This note is prepared by Donald Turner, acting as a Scribe for Ferd Mohs, MD.    Ferd Mohs, MD: The scribe's documentation has been prepared under my direction and personally reviewed by me in its entirety. I confirm that the notes above accurately reflects all work, treatment, procedures, and medical decision making performed by me.

## 2018-06-05 ENCOUNTER — TELEPHONE (OUTPATIENT)
Dept: MIDWIFE SERVICES | Age: 33
End: 2018-06-05

## 2018-06-05 DIAGNOSIS — Z83.3 FAMILY HISTORY OF DIABETES MELLITUS: ICD-10-CM

## 2018-06-05 DIAGNOSIS — Z01.419 WOMEN'S ANNUAL ROUTINE GYNECOLOGICAL EXAMINATION: Primary | ICD-10-CM

## 2018-06-05 NOTE — TELEPHONE ENCOUNTER
Pt in office stating her platelet levels are high at 480 and would like to know if we may know why. Release signed and we are requesting a copy of the lab results. Please call.

## 2018-06-05 NOTE — TELEPHONE ENCOUNTER
Called pt back to review labs (platelets mildly elevated). Reassurance provided. Also reviewed normal pap results and encouraged pt to keep appt with hematology/oncology on the 11th.

## 2018-06-11 ENCOUNTER — OFFICE VISIT (OUTPATIENT)
Dept: GYNECOLOGY | Age: 33
End: 2018-06-11

## 2018-06-11 VITALS
HEIGHT: 70 IN | DIASTOLIC BLOOD PRESSURE: 73 MMHG | HEART RATE: 79 BPM | BODY MASS INDEX: 32.18 KG/M2 | SYSTOLIC BLOOD PRESSURE: 109 MMHG | WEIGHT: 224.8 LBS

## 2018-06-11 DIAGNOSIS — C80.1: Primary | ICD-10-CM

## 2018-06-11 NOTE — PROGRESS NOTES
[facility] [address] [address] [facility]  P [phone]  F [phone]    Office Note  Patient ID:  Name:  Mike Lindsay  MRN:  [id number]  :  1985/33 y.o. Date:  2018      HISTORY OF PRESENT ILLNESS:  Mike Lindsay is a 35 y.o.  premenopausal female who is being seen for a new diagnosis of:    ICD-10-CM ICD-9-CM    1. 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 Pathology:      FINAL PATHOLOGIC DIAGNOSIS   1. Cervix, LEEP conization:   Focal microinvasive squamous cell carcinoma (see synoptic report). Extensive severe dysplasia/carcinoma in situ. SYNOPTIC REPORT   SPECIMEN   Specimen : Cervix   SURGICAL PATHOLOGY REPORT   TUMOR   Primary Tumor Site: Not specified   Tumor Size: < 0.7 mm   Histologic Type: Early microinvasive adenocarcinoma   Histologic Grade: G1   Tumor Extent   Depth of Invasion in Millimeters: <0.7 mm   Horizontal Extent in Millimeters: <0.5 mm   Accessory Tumor Findings   Lymph-Vascular Invasion: Not identified   MARGINS   Endocervical Margin: Uninvolved by invasive carcinoma   Status of Endocervical Margin Involvement by Squamous Intraepithelial Lesion: Involved by squamous intraepithelial lesion   Exocervical Margin: Uninvolved by invasive carcinoma   Status of Exocervical Margin Involvement by Squamous Intraepithelial Lesion: Uninvolved by squamous intraepithelial lesion   Deep Margin: Uninvolved by invasive carcinoma   Distance of Invasive Carcinoma from Margin: 1.5 mm   Specify Location: endocervical margin   Status of Deep Margin Involvement by Squamous Intraepithelial Lesion: Uninvolved by squamous intraepithelial lesion   Pathologic Staging   pT1a1   pNX     Addendum changed histology to SCC. Depth of invasion maintainded. Poor Compliance to follow up.  Not seen since LEEP    Cytology 5/2018:  Diagnosis Comment    Final   Comment:   NEGATIVE FOR INTRAEPITHELIAL LESION AND MALIGNANCY. THIS SPECIMEN WAS RESCREENED AS PART OF OUR  PROGRAM.      The patient presents for evaluation and treatment recommendations. The patient desires consideration of future fertility. 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 ROS:   and GI review: Negative  Cardiopulmonary review:Negative   Musculoskeletal:  Negative    A comprehensive review of systems was negative except for that written in the History of Present Illness. , 10 point ROS    OB/GYN ROS:   Denies, dysuria, hematuria, urinary incontinence, vaginal discharge, abnormal vaginal bleeding, pelvic pain  Patient denies any abnormal bleeding or vaginal discharge.  .    Karnofsky Score: 100  ECOG stGstrstastdstest:st st1st Problem List:  Patient Active Problem List    Diagnosis Date Noted    Squamous cell carcinoma, microinvasive ([facility] [address] Cervix 07/03/2017    Reactive hypoglycemia 06/08/2011     PMH:  Past Medical History:   Diagnosis Date    Abnormal Pap smear     Dr. Lindsey Fang, last normal 1/2010    Abnormal Papanicolaou smear of cervix     AR (allergic rhinitis)     allergy resting normal per pt    Hypoglycemia 2004    Dr. Adama Hope      PSH:  Past Surgical History:   Procedure Laterality Date    HX CHOLECYSTECTOMY  02/2014    HX HEENT      lasix    HX WISDOM TEETH EXTRACTION      WI CONIZATION CERVIX,LOOP ELECTRD N/A 5/26/2017    LOOP ELECTRODE EXCISION PROCEDURE OF CERVIX (LEEP)/ CONIZATION performed by Vladimir Jenkins MD at [facility]      Social History:  Social History   Substance Use Topics    Smoking status: Never Smoker    Smokeless tobacco: Never Used    Alcohol use Yes      Comment: 1 drink a month      Family History:  Family History   Problem Relation Age of Onset    Cancer Maternal Grandmother      colon    MS Father     Diabetes Maternal Grandfather     Hypertension Maternal Grandfather     Hypertension Mother     High Cholesterol Mother       Medications: (reviewed)  Current Outpatient Prescriptions   Medication Sig    drospirenone-ethinyl estradiol (MARY) 3-0.02 mg tab Take 1 Tab by mouth daily. Indications: Pregnancy Contraception    ibuprofen (MOTRIN) 800 mg tablet Take 1 Tab by mouth every eight (8) hours as needed for Pain. Indications: Pain     No current facility-administered medications for this visit. Allergies: (reviewed)  No Known Allergies       OBJECTIVE:    Physical Exam:  VITAL SIGNS: Vitals:    06/11/18 0921   BP: 109/73   Pulse: 79   Weight: 224 lb 12.8 oz (102 kg)   Height: 5' 10\" (1.778 m)     Body mass index is 32.26 kg/(m^2). GENERAL CHARU: Conversant, alert, oriented. No acute distress. HEENT: HEENT. No thyroid enlargement. No JVD. Neck: Supple without restrictions. RESPIRATORY: Clear to auscultation and percussion to the bases. No CVAT. CARDIOVASC: RRR without murmur/rub. GASTROINT: soft, non-tender, without masses or organomegaly, normal bowel sounds, without guarding, without rebound   MUSCULOSKEL: full range of motion without pain       EXTREMITIES: extremities normal, atraumatic, no cyanosis or edema, Homans sign is negative, no sign of DVT   PELVIC: External genitalia: normal general appearance, BUS negative  Vaginal: normal mucosa without prolapse or lesions  Cervix: normal appearance, not friable, mobile, nontender. Not expanded. Adnexa: normal bimanual exam and non palpable  Uterus: normal single, nontender and mid-position   RECTAL: Deferred   BASHIR SURVEY: No suspicious lymphadenopathy or edema noted. NEURO: Grossly intact. No acute deficit.        Lab Date as available:    Lab Results   Component Value Date/Time    WBC 12.9 (H) 09/23/2013 11:42 AM    HGB 9.1 (L) 09/23/2013 11:42 AM    HCT 29.0 (L) 09/23/2013 11:42 AM PLATELET 981 67/14/0903 11:42 AM    MCV 82 09/23/2013 11:42 AM    Hgb, External 9.1  g/dL  - LOW 10/01/2013    Hct, External 29.0  %  - LOW 10/01/2013    Platelet cnt., External 290  x10E3. uL  -  WNL 10/01/2013     Lab Results   Component Value Date/Time    Sodium 137 07/31/2013 04:52 PM    Potassium 3.6 07/31/2013 04:52 PM    Chloride 103 07/31/2013 04:52 PM    CO2 24 07/31/2013 04:52 PM    Anion gap 10 07/31/2013 04:52 PM    Glucose 121 (H) 07/31/2013 04:52 PM    Glucose 90 06/20/2011 12:20 PM    Glucose - Fasting 96 (H) 09/30/2013 08:49 AM    BUN 6 07/31/2013 04:52 PM    Creatinine 0.50 07/31/2013 04:52 PM    BUN/Creatinine ratio 12 07/31/2013 04:52 PM    GFR est AA >60 07/31/2013 04:52 PM    GFR est non-AA >60 07/31/2013 04:52 PM    Calcium 8.6 07/31/2013 04:52 PM       Imaging:      IMPRESSION/PLAN:    Elisabeth Morales is a 35 y.o. female with a working diagnosis of     ICD-10-CM ICD-9-CM    1. Squamous cell carcinoma, microinvasive (HCC) Cervix C53.9 180.9        Problems:     Patient Active Problem List    Diagnosis Date Noted    Squamous cell carcinoma, microinvasive ([facility] [address] Cervix 07/03/2017    Reactive hypoglycemia 06/08/2011       I reviewed with Elisabeth Morales her medical records, physical exam, and review of symptoms. I have recommended simple hysterectomy for therapeutic intent. The patient desires future fertility and wants to be followed conservatively. In view of the interval since LEEP and the normal cytology 5/2018 I have agreed to follow the patient at three month interval with examinations and cytology. The importance of compliance and reporting any abnormal bleeding was discussed. The risks of recurrent SCC, invasive, is understood. Return three months or PRN.     Defined Sensitive Document    >50% of total time allocated to visit dedicated to counseling, 45 minutes total.    Signed By: Trino Ramirez MD     6/11/2018/9:27 AM

## 2018-06-11 NOTE — PROGRESS NOTES
Referred by Dr. Natalie Caceres (Dr. Romario Ramos has retired), abnormal pathology dated 5/26/17    1. Have you been to the ER, urgent care clinic since your last visit? Hospitalized since your last visit? no    2. Have you seen or consulted any other health care providers outside of the [address] [place] [address] since your last visit? Include any pap smears or colon screening.  no

## 2018-06-30 DIAGNOSIS — N92.0 EXCESSIVE OR FREQUENT MENSTRUATION: ICD-10-CM

## 2018-07-09 RX ORDER — DROSPIRENONE AND ETHINYL ESTRADIOL TABLETS 0.02-3(28)
KIT ORAL
Qty: 28 TAB | Refills: 0 | Status: SHIPPED | OUTPATIENT
Start: 2018-07-09 | End: 2019-05-05

## 2019-05-05 ENCOUNTER — APPOINTMENT (OUTPATIENT)
Dept: CT IMAGING | Age: 34
End: 2019-05-05
Attending: EMERGENCY MEDICINE
Payer: COMMERCIAL

## 2019-05-05 ENCOUNTER — HOSPITAL ENCOUNTER (EMERGENCY)
Age: 34
Discharge: HOME | End: 2019-05-05
Attending: EMERGENCY MEDICINE
Payer: COMMERCIAL

## 2019-05-05 VITALS
RESPIRATION RATE: 13 BRPM | OXYGEN SATURATION: 100 % | TEMPERATURE: 98.1 F | HEART RATE: 61 BPM | SYSTOLIC BLOOD PRESSURE: 127 MMHG | DIASTOLIC BLOOD PRESSURE: 64 MMHG

## 2019-05-05 DIAGNOSIS — R20.0 NUMBNESS AND TINGLING: ICD-10-CM

## 2019-05-05 DIAGNOSIS — R20.2 NUMBNESS AND TINGLING: ICD-10-CM

## 2019-05-05 DIAGNOSIS — H53.9 VISUAL DISTURBANCE: ICD-10-CM

## 2019-05-05 DIAGNOSIS — H53.8 BLURRED VISION: Primary | ICD-10-CM

## 2019-05-05 DIAGNOSIS — G44.209 ACUTE NON INTRACTABLE TENSION-TYPE HEADACHE: ICD-10-CM

## 2019-05-05 DIAGNOSIS — F41.9 ANXIETY DISORDER, UNSPECIFIED TYPE: ICD-10-CM

## 2019-05-05 LAB
ALBUMIN SERPL-MCNC: 3.6 G/DL (ref 3.5–5)
ALBUMIN/GLOB SERPL: 1.1 {RATIO} (ref 1.1–2.2)
ALP SERPL-CCNC: 62 U/L (ref 45–117)
ALT SERPL-CCNC: 25 U/L (ref 12–78)
ANION GAP SERPL CALC-SCNC: 7 MMOL/L (ref 5–15)
APPEARANCE UR: CLEAR
AST SERPL-CCNC: 16 U/L (ref 15–37)
BACTERIA URNS QL MICRO: NEGATIVE /HPF
BASOPHILS # BLD: 0.1 K/UL (ref 0–0.1)
BASOPHILS NFR BLD: 1 % (ref 0–1)
BILIRUB SERPL-MCNC: 0.3 MG/DL (ref 0.2–1)
BILIRUB UR QL: NEGATIVE
BUN SERPL-MCNC: 14 MG/DL (ref 6–20)
BUN/CREAT SERPL: 16 (ref 12–20)
CALCIUM SERPL-MCNC: 8.4 MG/DL (ref 8.5–10.1)
CHLORIDE SERPL-SCNC: 107 MMOL/L (ref 97–108)
CK SERPL-CCNC: 118 U/L (ref 26–192)
CO2 SERPL-SCNC: 23 MMOL/L (ref 21–32)
COLOR UR: NORMAL
CREAT SERPL-MCNC: 0.9 MG/DL (ref 0.55–1.02)
DIFFERENTIAL METHOD BLD: ABNORMAL
EOSINOPHIL # BLD: 0.2 K/UL (ref 0–0.4)
EOSINOPHIL NFR BLD: 1 % (ref 0–7)
EPITH CASTS URNS QL MICRO: NORMAL /LPF
ERYTHROCYTE [DISTWIDTH] IN BLOOD BY AUTOMATED COUNT: 14.2 % (ref 11.5–14.5)
GLOBULIN SER CALC-MCNC: 3.4 G/DL (ref 2–4)
GLUCOSE SERPL-MCNC: 86 MG/DL (ref 65–100)
GLUCOSE UR STRIP.AUTO-MCNC: NEGATIVE MG/DL
HCT VFR BLD AUTO: 35.8 % (ref 35–47)
HGB BLD-MCNC: 11.2 G/DL (ref 11.5–16)
HGB UR QL STRIP: NEGATIVE
HYALINE CASTS URNS QL MICRO: NORMAL /LPF (ref 0–5)
IMM GRANULOCYTES # BLD AUTO: 0.1 K/UL (ref 0–0.04)
IMM GRANULOCYTES NFR BLD AUTO: 0 % (ref 0–0.5)
KETONES UR QL STRIP.AUTO: NEGATIVE MG/DL
LEUKOCYTE ESTERASE UR QL STRIP.AUTO: NEGATIVE
LYMPHOCYTES # BLD: 2.7 K/UL (ref 0.8–3.5)
LYMPHOCYTES NFR BLD: 16 % (ref 12–49)
MCH RBC QN AUTO: 25.3 PG (ref 26–34)
MCHC RBC AUTO-ENTMCNC: 31.3 G/DL (ref 30–36.5)
MCV RBC AUTO: 81 FL (ref 80–99)
MONOCYTES # BLD: 0.9 K/UL (ref 0–1)
MONOCYTES NFR BLD: 5 % (ref 5–13)
NEUTS SEG # BLD: 12.6 K/UL (ref 1.8–8)
NEUTS SEG NFR BLD: 77 % (ref 32–75)
NITRITE UR QL STRIP.AUTO: NEGATIVE
NRBC # BLD: 0 K/UL (ref 0–0.01)
NRBC BLD-RTO: 0 PER 100 WBC
PH UR STRIP: 5.5 [PH] (ref 5–8)
PLATELET # BLD AUTO: 438 K/UL (ref 150–400)
PMV BLD AUTO: 9.2 FL (ref 8.9–12.9)
POTASSIUM SERPL-SCNC: 3.8 MMOL/L (ref 3.5–5.1)
PROT SERPL-MCNC: 7 G/DL (ref 6.4–8.2)
PROT UR STRIP-MCNC: NEGATIVE MG/DL
RBC # BLD AUTO: 4.42 M/UL (ref 3.8–5.2)
RBC #/AREA URNS HPF: NORMAL /HPF (ref 0–5)
SODIUM SERPL-SCNC: 137 MMOL/L (ref 136–145)
SP GR UR REFRACTOMETRY: 1.01 (ref 1–1.03)
UA: UC IF INDICATED,UAUC: NORMAL
UROBILINOGEN UR QL STRIP.AUTO: 0.2 EU/DL (ref 0.2–1)
WBC # BLD AUTO: 16.6 K/UL (ref 3.6–11)
WBC URNS QL MICRO: NORMAL /HPF (ref 0–4)

## 2019-05-05 PROCEDURE — 96360 HYDRATION IV INFUSION INIT: CPT

## 2019-05-05 PROCEDURE — 70450 CT HEAD/BRAIN W/O DYE: CPT

## 2019-05-05 PROCEDURE — 99285 EMERGENCY DEPT VISIT HI MDM: CPT

## 2019-05-05 PROCEDURE — 80053 COMPREHEN METABOLIC PANEL: CPT

## 2019-05-05 PROCEDURE — 36415 COLL VENOUS BLD VENIPUNCTURE: CPT

## 2019-05-05 PROCEDURE — 85025 COMPLETE CBC W/AUTO DIFF WBC: CPT

## 2019-05-05 PROCEDURE — 74011250636 HC RX REV CODE- 250/636: Performed by: EMERGENCY MEDICINE

## 2019-05-05 PROCEDURE — 82550 ASSAY OF CK (CPK): CPT

## 2019-05-05 PROCEDURE — 93005 ELECTROCARDIOGRAM TRACING: CPT

## 2019-05-05 PROCEDURE — 81001 URINALYSIS AUTO W/SCOPE: CPT

## 2019-05-05 RX ORDER — HYDROXYZINE 25 MG/1
25 TABLET, FILM COATED ORAL
Qty: 20 TAB | Refills: 0 | Status: SHIPPED | OUTPATIENT
Start: 2019-05-05 | End: 2019-05-15

## 2019-05-05 RX ADMIN — SODIUM CHLORIDE 1000 ML: 900 INJECTION, SOLUTION INTRAVENOUS at 14:28

## 2019-05-05 NOTE — LETTER
[address] 
[facility] EMERGENCY DEPT 
[address] [place] [address] [id number] [phone] Work/School Note Date: 5/5/2019 To Whom It May concern: 
 
Silas Rausch was seen and treated today in the emergency room by the following provider(s): 
Attending Provider: Jessica Moss MD.   
 
Silas Rausch may return to work on 5/6/19. Sincerely, Yodit Hogan MD

## 2019-05-05 NOTE — DISCHARGE INSTRUCTIONS
Thank you for allowing us to take care of you today! We hope we addressed all of your concerns and needs. We strive to provide excellent quality care in the Emergency Department. You will receive a survey after your visit to evaluate the care you were provided. Should you receive a survey from us, we invite you to share your experience and tell us what made it excellent. It was a pleasure serving you, we invite you to share your experience with us, in our pursuit for excellence, should you be selected to receive a survey. The exam and treatment you received in the Emergency Department were for an urgent problem and are not intended as complete care. It is important that you follow up with a doctor, nurse practitioner, or physician assistant for ongoing care. If your symptoms become worse or you do not improve as expected and you are unable to reach your usual health care provider, you should return to the Emergency Department. We are available 24 hours a day. Please take your discharge instructions with you when you go to your follow-up appointment. If you have any problem arranging a follow-up appointment, contact the Emergency Department immediately. If a prescription has been provided, please have it filled as soon as possible to prevent a delay in treatment. Read the entire medication instruction sheet provided to you by the pharmacy. If you have any questions or reservations about taking the medication due to side effects or interactions with other medications, please call your primary care physician or contact the ER to speak with the charge nurse. Make an appointment with your family doctor or the physician you were referred to for follow-up of this visit as instructed on your discharge paperwork, as this is mandatory follow-up. Return to the ER if you are unable to be seen or if you are unable to be seen in a timely manner.     If you have any problem arranging the follow-up visit, contact the Emergency Department immediately. I hope you feel better and thank you again for allow us to provide you with excellent care today at [facility]!       Warmest regards,    Shukri Perez MD  Emergency Medicine Physician  [facility]

## 2019-05-05 NOTE — ED NOTES
Assumed care from EMS. Patient is a . Patient states that she was doing field training and had a panic attack about an hour ago and when it was over had blurred vision in the left eye. Patient states that it has gotten better since but some visual disturbances.
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
## 2019-05-06 LAB
ATRIAL RATE: 65 BPM
CALCULATED P AXIS, ECG09: 55 DEGREES
CALCULATED R AXIS, ECG10: 6 DEGREES
CALCULATED T AXIS, ECG11: 8 DEGREES
DIAGNOSIS, 93000: NORMAL
P-R INTERVAL, ECG05: 122 MS
Q-T INTERVAL, ECG07: 416 MS
QRS DURATION, ECG06: 98 MS
QTC CALCULATION (BEZET), ECG08: 432 MS
VENTRICULAR RATE, ECG03: 65 BPM

## 2019-05-14 ENCOUNTER — APPOINTMENT (OUTPATIENT)
Dept: GENERAL RADIOLOGY | Age: 34
End: 2019-05-14
Attending: EMERGENCY MEDICINE
Payer: COMMERCIAL

## 2019-05-14 ENCOUNTER — HOSPITAL ENCOUNTER (EMERGENCY)
Age: 34
Discharge: HOME | End: 2019-05-14
Attending: EMERGENCY MEDICINE
Payer: COMMERCIAL

## 2019-05-14 VITALS
HEIGHT: 70 IN | BODY MASS INDEX: 31.5 KG/M2 | SYSTOLIC BLOOD PRESSURE: 118 MMHG | RESPIRATION RATE: 12 BRPM | HEART RATE: 57 BPM | TEMPERATURE: 99.1 F | DIASTOLIC BLOOD PRESSURE: 50 MMHG | WEIGHT: 220 LBS | OXYGEN SATURATION: 99 %

## 2019-05-14 DIAGNOSIS — I49.3 SYMPTOMATIC PVCS: ICD-10-CM

## 2019-05-14 DIAGNOSIS — R06.02 SOB (SHORTNESS OF BREATH): ICD-10-CM

## 2019-05-14 DIAGNOSIS — R00.2 PALPITATIONS: Primary | ICD-10-CM

## 2019-05-14 DIAGNOSIS — R07.89 ATYPICAL CHEST PAIN: ICD-10-CM

## 2019-05-14 LAB
ALBUMIN SERPL-MCNC: 3.4 G/DL (ref 3.5–5)
ALBUMIN/GLOB SERPL: 1 {RATIO} (ref 1.1–2.2)
ALP SERPL-CCNC: 62 U/L (ref 45–117)
ALT SERPL-CCNC: 18 U/L (ref 12–78)
ANION GAP SERPL CALC-SCNC: 3 MMOL/L (ref 5–15)
AST SERPL-CCNC: 12 U/L (ref 15–37)
ATRIAL RATE: 50 BPM
BASOPHILS # BLD: 0 K/UL (ref 0–0.1)
BASOPHILS NFR BLD: 0 % (ref 0–1)
BILIRUB SERPL-MCNC: 0.4 MG/DL (ref 0.2–1)
BNP SERPL-MCNC: 28 PG/ML
BUN SERPL-MCNC: 9 MG/DL (ref 6–20)
BUN/CREAT SERPL: 12 (ref 12–20)
CALCIUM SERPL-MCNC: 8 MG/DL (ref 8.5–10.1)
CALCULATED P AXIS, ECG09: 51 DEGREES
CALCULATED R AXIS, ECG10: 8 DEGREES
CALCULATED T AXIS, ECG11: 10 DEGREES
CHLORIDE SERPL-SCNC: 108 MMOL/L (ref 97–108)
CK SERPL-CCNC: 77 U/L (ref 26–192)
CO2 SERPL-SCNC: 26 MMOL/L (ref 21–32)
CREAT SERPL-MCNC: 0.75 MG/DL (ref 0.55–1.02)
D DIMER PPP FEU-MCNC: 0.25 MG/L FEU (ref 0–0.65)
DIAGNOSIS, 93000: NORMAL
DIFFERENTIAL METHOD BLD: ABNORMAL
EOSINOPHIL # BLD: 0.2 K/UL (ref 0–0.4)
EOSINOPHIL NFR BLD: 2 % (ref 0–7)
ERYTHROCYTE [DISTWIDTH] IN BLOOD BY AUTOMATED COUNT: 14.3 % (ref 11.5–14.5)
GLOBULIN SER CALC-MCNC: 3.3 G/DL (ref 2–4)
GLUCOSE SERPL-MCNC: 98 MG/DL (ref 65–100)
HCT VFR BLD AUTO: 37.1 % (ref 35–47)
HGB BLD-MCNC: 11.7 G/DL (ref 11.5–16)
IMM GRANULOCYTES # BLD AUTO: 0 K/UL (ref 0–0.04)
IMM GRANULOCYTES NFR BLD AUTO: 0 % (ref 0–0.5)
LYMPHOCYTES # BLD: 2.5 K/UL (ref 0.8–3.5)
LYMPHOCYTES NFR BLD: 26 % (ref 12–49)
MAGNESIUM SERPL-MCNC: 2.2 MG/DL (ref 1.6–2.4)
MCH RBC QN AUTO: 25.4 PG (ref 26–34)
MCHC RBC AUTO-ENTMCNC: 31.5 G/DL (ref 30–36.5)
MCV RBC AUTO: 80.7 FL (ref 80–99)
MONOCYTES # BLD: 0.6 K/UL (ref 0–1)
MONOCYTES NFR BLD: 6 % (ref 5–13)
NEUTS SEG # BLD: 6.4 K/UL (ref 1.8–8)
NEUTS SEG NFR BLD: 66 % (ref 32–75)
NRBC # BLD: 0 K/UL (ref 0–0.01)
NRBC BLD-RTO: 0 PER 100 WBC
P-R INTERVAL, ECG05: 126 MS
PLATELET # BLD AUTO: 399 K/UL (ref 150–400)
PMV BLD AUTO: 9.4 FL (ref 8.9–12.9)
POTASSIUM SERPL-SCNC: 4.2 MMOL/L (ref 3.5–5.1)
PROT SERPL-MCNC: 6.7 G/DL (ref 6.4–8.2)
Q-T INTERVAL, ECG07: 462 MS
QRS DURATION, ECG06: 96 MS
QTC CALCULATION (BEZET), ECG08: 421 MS
RBC # BLD AUTO: 4.6 M/UL (ref 3.8–5.2)
SODIUM SERPL-SCNC: 137 MMOL/L (ref 136–145)
TROPONIN I SERPL-MCNC: <0.05 NG/ML
TSH SERPL DL<=0.05 MIU/L-ACNC: 3.72 UIU/ML (ref 0.36–3.74)
VENTRICULAR RATE, ECG03: 50 BPM
WBC # BLD AUTO: 9.8 K/UL (ref 3.6–11)

## 2019-05-14 PROCEDURE — 83735 ASSAY OF MAGNESIUM: CPT

## 2019-05-14 PROCEDURE — 85025 COMPLETE CBC W/AUTO DIFF WBC: CPT

## 2019-05-14 PROCEDURE — 36415 COLL VENOUS BLD VENIPUNCTURE: CPT

## 2019-05-14 PROCEDURE — 83880 ASSAY OF NATRIURETIC PEPTIDE: CPT

## 2019-05-14 PROCEDURE — 99285 EMERGENCY DEPT VISIT HI MDM: CPT

## 2019-05-14 PROCEDURE — 84443 ASSAY THYROID STIM HORMONE: CPT

## 2019-05-14 PROCEDURE — 93005 ELECTROCARDIOGRAM TRACING: CPT

## 2019-05-14 PROCEDURE — 82550 ASSAY OF CK (CPK): CPT

## 2019-05-14 PROCEDURE — 85379 FIBRIN DEGRADATION QUANT: CPT

## 2019-05-14 PROCEDURE — 80053 COMPREHEN METABOLIC PANEL: CPT

## 2019-05-14 PROCEDURE — 84484 ASSAY OF TROPONIN QUANT: CPT

## 2019-05-14 PROCEDURE — 71046 X-RAY EXAM CHEST 2 VIEWS: CPT

## 2019-05-14 NOTE — DISCHARGE INSTRUCTIONS
Patient Education        Cardiac Arrhythmia: Care Instructions  Your Care Instructions    A cardiac arrhythmia is a change in the normal rhythm of the heart. Your heart may beat too fast or too slow or beat with an irregular or skipping rhythm. A change in the heart's rhythm may feel like a really strong heartbeat or a fluttering in your chest. A severe heart rhythm problem can keep the body from getting the blood it needs. This can result in shortness of breath, lightheadedness, and fainting. You may take medicine to treat your condition. Your doctor may recommend a pacemaker or recommend catheter ablation to destroy small parts of the heart that are causing a rhythm problem. Another possible treatment is an implantable cardioverter-defibrillator (ICD). An ICD is a device that gives the heart a shock to return the heart to a normal rhythm. Follow-up care is a key part of your treatment and safety. Be sure to make and go to all appointments, and call your doctor if you are having problems. It's also a good idea to know your test results and keep a list of the medicines you take. How can you care for yourself at home?  [facility] care    · Be safe with medicines. Take your medicines exactly as prescribed. Call your doctor if you think you are having a problem with your medicine. You will get more details on the specific medicines your doctor prescribes.     · If you received a pacemaker or an ICD, you will get a fact sheet about it.     · Wear medical alert jewelry that says you have an abnormal heart rhythm. You can buy this at most drugstores.    Lifestyle changes    · Eat a heart-healthy diet.     · Stay at a healthy weight. Lose weight if you need to.     · Avoid nicotine, too much alcohol, and illegal drugs (meth, speed, and cocaine). Also, get enough sleep and do not overeat.     · Ask your doctor whether you can take over-the-counter medicines (such as decongestants).  These can make your heart beat fast.     · Talk to your doctor about any limits to activities, such as driving, or tasks where you use power tools or ladders. Activity    · Start light exercise if your doctor says you can. Even a small amount will help you get stronger, have more energy, and manage your stress.     · Get regular exercise. Try for 30 minutes on most days of the week. Ask your doctor what level of exercise is safe for you. If activity is not likely to cause health problems, you probably do not have limits on the type or level of activity that you can do. You may want to walk, swim, bike, or do other activities.     · When you exercise, watch for signs that your heart is working too hard. You are pushing too hard if you cannot talk while you exercise. If you become short of breath or dizzy or have chest pain, sit down and rest.     · Check your pulse daily. Place two fingers on the artery at the palm side of your wrist, in line with your thumb. If your heartbeat seems uneven, talk to your doctor. When should you call for help? Call 911 anytime you think you may need emergency care. For example, call if:    · You have symptoms of sudden heart failure. These may include:  ? Severe trouble breathing. ? A fast or irregular heartbeat. ? Coughing up pink, foamy mucus. ? You passed out.     · You have signs of a stroke. These include:  ? Sudden numbness, paralysis, or weakness in your face, arm, or leg, especially on only one side of your body. ? New problems with walking or balance. ? Sudden vision changes. ? Drooling or slurred speech. ? New problems speaking or understanding simple statements, or feeling confused. ? A sudden, severe headache that is different from past headaches.    Call your doctor now or seek immediate medical care if:    · You have new or changed symptoms of heart failure, such as:  ? New or increased shortness of breath. ? New or worse swelling in your legs, ankles, or feet.   ? Sudden weight gain, such as more than 2 to 3 pounds in a day or 5 pounds in a week. (Your doctor may suggest a different range of weight gain.)  ? Feeling dizzy or lightheaded or like you may faint. ? Feeling so tired or weak that you cannot do your usual activities. ? Not sleeping well. Shortness of breath wakes you at night. You need extra pillows to prop yourself up to breathe easier.    Watch closely for changes in your health, and be sure to contact your doctor if:    · You do not get better as expected. Where can you learn more? Go to http://vivian-leonardo.info/. Enter B851 in the search box to learn more about \"Cardiac Arrhythmia: Care Instructions. \"  Current as of: July 22, 2018  Content Version: 11.9  © 9313-6130 [employer]. Care instructions adapted under license by [employer] (which disclaims liability or warranty for this information). If you have questions about a medical condition or this instruction, always ask your healthcare professional. [place] [address] any warranty or liability for your use of this information. Patient Education        Chest Pain: Care Instructions  Your Care Instructions    There are many things that can cause chest pain. Some are not serious and will get better on their own in a few days. But some kinds of chest pain need more testing and treatment. Your doctor may have recommended a follow-up visit in the next 8 to 12 hours. If you are not getting better, you may need more tests or treatment. Even though your doctor has released you, you still need to watch for any problems. The doctor carefully checked you, but sometimes problems can develop later. If you have new symptoms or if your symptoms do not get better, get medical care right away. If you have worse or different chest pain or pressure that lasts more than 5 minutes or you passed out (lost consciousness), call 911 or seek other emergency help right away.    A medical visit is only one step in your treatment. Even if you feel better, you still need to do what your doctor recommends, such as going to all suggested follow-up appointments and taking medicines exactly as directed. This will help you recover and help prevent future problems. How can you care for yourself at home? · Rest until you feel better. · Take your medicine exactly as prescribed. Call your doctor if you think you are having a problem with your medicine. · Do not drive after taking a prescription pain medicine. When should you call for help? Call 911 if:    · You passed out (lost consciousness).     · You have severe difficulty breathing.     · You have symptoms of a heart attack. These may include:  ? Chest pain or pressure, or a strange feeling in your chest.  ? Sweating. ? Shortness of breath. ? Nausea or vomiting. ? Pain, pressure, or a strange feeling in your back, neck, jaw, or upper belly or in one or both shoulders or arms. ? Lightheadedness or sudden weakness. ? A fast or irregular heartbeat. After you call 911, the  may tell you to chew 1 adult-strength or 2 to 4 low-dose aspirin. Wait for an ambulance. Do not try to drive yourself.    Call your doctor today if:    · You have any trouble breathing.     · Your chest pain gets worse.     · You are dizzy or lightheaded, or you feel like you may faint.     · You are not getting better as expected.     · You are having new or different chest pain. Where can you learn more? Go to http://vivian-leonardo.info/. Enter A120 in the search box to learn more about \"Chest Pain: Care Instructions. \"  Current as of: September 23, 2018  Content Version: 11.9  © 0270-3951 [employer]. Care instructions adapted under license by [employer] (which disclaims liability or warranty for this information).  If you have questions about a medical condition or this instruction, always ask your healthcare professional. [employer] disclaims any warranty or liability for your use of this information.

## 2019-05-14 NOTE — ED PROVIDER NOTES
EMERGENCY DEPARTMENT HISTORY AND PHYSICAL EXAM 
 
 
Date: 5/14/2019 Patient Name: Katie Shepherd Patient Age and Sex: 29 y.o. female History of Presenting Illness Chief Complaint Patient presents with  Irregular Heart Beat  
  pt with feeling of chest palpitations and irregular heart rate since this morning History Provided By: Patient and EMS 
 
HPI: Katie Shepherd, 29 y.o. female with PMHx significant for allergic rhinitis presents via EMS with complaints of palpitations onset at 6:45 AM.  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 She says the palpitations would last a few seconds and reports sharp pain that substernal in location. Patient felt her heart rate and it went up to 110's. Patient does endorse drinking some i-nexusZABETH prior to the episode. She denies any increased caffeine use or chocolate intake. She denies any increased stress. Patient was given 324 mg of aspirin prior to arrival as well as 500 cc of fluids. She denies any underlying cardiac history. Denies any history of CHF. Additionally, patient denies any underlying thyroid disease. She says that when she felt the palpitations she also reported some associated dizziness. She otherwise denies any headaches, vision changes. Additionally, she denies any history of DVT or PE. She denies any recent prolonged travel. Pt denies any other alleviating or exacerbating factors. Additionally, pt specifically denies any recent fever, chills, headache, nausea, vomiting, diarrhea, abdominal pain,, lightheadedness, dizziness, numbness, weakness, tingling, BLE swelling, urinary sxs, changes in BM, changes in PO intake, melena, hematochezia, cough, or congestion. PCP: Bertram Weiner MD 
 
There are no other complaints, changes or physical findings at this time. Past History Past Medical History: 
Past Medical History: Diagnosis Date  Abnormal Pap smear Dr. Elena Menezes, last normal 1/2010  Abnormal Papanicolaou smear of cervix  AR (allergic rhinitis) allergy resting normal per pt  Hypoglycemia 2004 Dr. Srinivas West Past Surgical History: 
Past Surgical History:  
Procedure Laterality Date  HX CHOLECYSTECTOMY  02/2014  HX HEENT    
 lasix  HX WISDOM TEETH EXTRACTION    
 DC CONIZATION CERVIX,LOOP ELECTRD N/A 5/26/2017 LOOP ELECTRODE EXCISION PROCEDURE OF CERVIX (LEEP)/ CONIZATION performed by Paramjit Anderson MD at [facility] Family History: 
Family History Problem Relation Age of Onset  Cancer Maternal Grandmother   
     colon  MS Father  Diabetes Maternal Grandfather  Hypertension Maternal Grandfather  Hypertension Mother  High Cholesterol Mother Social History: 
Social History Tobacco Use  Smoking status: Never Smoker  Smokeless tobacco: Never Used Substance Use Topics  Alcohol use: Yes Comment: 1 drink a month  Drug use: No  
 
 
Allergies: 
No Known Allergies Medications: No current facility-administered medications on file prior to encounter. Current Outpatient Medications on File Prior to Encounter Medication Sig Dispense Refill  hydrOXYzine HCl (ATARAX) 25 mg tablet Take 1 Tab by mouth three (3) times daily as needed for Anxiety for up to 10 days. 20 Tab 0 Review of Systems Review of Systems Constitutional: Negative. Negative for chills and fever. HENT: Negative. Negative for congestion, facial swelling, rhinorrhea, sore throat, trouble swallowing and voice change. Eyes: Negative. Respiratory: Positive for chest tightness. Negative for apnea, cough, shortness of breath and wheezing. Cardiovascular: Positive for chest pain and palpitations. Negative for leg swelling. Gastrointestinal: Negative.   Negative for abdominal distention, abdominal pain, blood in stool, constipation, diarrhea, nausea and vomiting. Endocrine: Negative. Negative for cold intolerance, heat intolerance and polyuria. Genitourinary: Negative. Negative for difficulty urinating, dysuria, flank pain, frequency, hematuria and urgency. Musculoskeletal: Negative. Negative for arthralgias, back pain, myalgias, neck pain and neck stiffness. Skin: Negative. Negative for color change and rash. Neurological: Negative. Negative for dizziness, syncope, facial asymmetry, speech difficulty, weakness, light-headedness, numbness and headaches. Hematological: Negative. Does not bruise/bleed easily. Psychiatric/Behavioral: Negative. Negative for confusion and self-injury. The patient is not nervous/anxious. Physical Exam  
Physical Exam  
Constitutional: She is oriented to person, place, and time. She appears well-developed and well-nourished. No distress. HENT:  
Head: Normocephalic and atraumatic. Mouth/Throat: Oropharynx is clear and moist. No oropharyngeal exudate. Eyes: Pupils are equal, round, and reactive to light. Conjunctivae and EOM are normal.  
Neck: Normal range of motion. Cardiovascular: Normal rate, regular rhythm and normal heart sounds. Exam reveals no gallop and no friction rub. No murmur heard. Pulmonary/Chest: Effort normal and breath sounds normal. No respiratory distress. She has no wheezes. She has no rales. She exhibits no tenderness. Abdominal: Soft. Bowel sounds are normal. She exhibits no distension and no mass. There is no tenderness. There is no rebound and no guarding. Musculoskeletal: Normal range of motion. She exhibits no edema, tenderness or deformity. Neurological: She is alert and oriented to person, place, and time. She displays normal reflexes. No cranial nerve deficit. She exhibits normal muscle tone. Coordination normal.  
Skin: Skin is warm. No rash noted. She is not diaphoretic. Psychiatric: She has a normal mood and affect. Nursing note and vitals reviewed. Diagnostic Study Results Labs - Recent Results (from the past 24 hour(s)) EKG, 12 LEAD, INITIAL Collection Time: 05/14/19  9:34 AM  
Result Value Ref Range Ventricular Rate 50 BPM  
 Atrial Rate 50 BPM  
 P-R Interval 126 ms  
 QRS Duration 96 ms  
 Q-T Interval 462 ms QTC Calculation (Bezet) 421 ms Calculated P Axis 51 degrees Calculated R Axis 8 degrees Calculated T Axis 10 degrees Diagnosis Sinus bradycardia with sinus arrhythmia RSR' or QR pattern in V1 suggests right ventricular conduction delay When compared with ECG of 05-MAY-2019 14:45, No significant change was found Confirmed by Zahira Santos ([id number]) on 5/14/2019 12:05:26 PM 
  
CBC WITH AUTOMATED DIFF Collection Time: 05/14/19  9:55 AM  
Result Value Ref Range WBC 9.8 3.6 - 11.0 K/uL  
 RBC 4.60 3.80 - 5.20 M/uL  
 HGB 11.7 11.5 - 16.0 g/dL HCT 37.1 35.0 - 47.0 % MCV 80.7 80.0 - 99.0 FL  
 MCH 25.4 (L) 26.0 - 34.0 PG  
 MCHC 31.5 30.0 - 36.5 g/dL  
 RDW 14.3 11.5 - 14.5 % PLATELET 345 495 - 314 K/uL MPV 9.4 8.9 - 12.9 FL  
 NRBC 0.0 0  WBC ABSOLUTE NRBC 0.00 0.00 - 0.01 K/uL NEUTROPHILS 66 32 - 75 % LYMPHOCYTES 26 12 - 49 % MONOCYTES 6 5 - 13 % EOSINOPHILS 2 0 - 7 % BASOPHILS 0 0 - 1 % IMMATURE GRANULOCYTES 0 0.0 - 0.5 % ABS. NEUTROPHILS 6.4 1.8 - 8.0 K/UL  
 ABS. LYMPHOCYTES 2.5 0.8 - 3.5 K/UL  
 ABS. MONOCYTES 0.6 0.0 - 1.0 K/UL  
 ABS. EOSINOPHILS 0.2 0.0 - 0.4 K/UL  
 ABS. BASOPHILS 0.0 0.0 - 0.1 K/UL  
 ABS. IMM. GRANS. 0.0 0.00 - 0.04 K/UL  
 DF AUTOMATED METABOLIC PANEL, COMPREHENSIVE Collection Time: 05/14/19  9:55 AM  
Result Value Ref Range Sodium 137 136 - 145 mmol/L Potassium 4.2 3.5 - 5.1 mmol/L Chloride 108 97 - 108 mmol/L  
 CO2 26 21 - 32 mmol/L Anion gap 3 (L) 5 - 15 mmol/L Glucose 98 65 - 100 mg/dL  BUN 9 6 - 20 MG/DL  
 Creatinine 0.75 0.55 - 1.02 MG/DL  
 BUN/Creatinine ratio 12 12 - 20 GFR est AA >60 >60 ml/min/1.73m2 GFR est non-AA >60 >60 ml/min/1.73m2 Calcium 8.0 (L) 8.5 - 10.1 MG/DL Bilirubin, total 0.4 0.2 - 1.0 MG/DL  
 ALT (SGPT) 18 12 - 78 U/L  
 AST (SGOT) 12 (L) 15 - 37 U/L Alk. phosphatase 62 45 - 117 U/L Protein, total 6.7 6.4 - 8.2 g/dL Albumin 3.4 (L) 3.5 - 5.0 g/dL Globulin 3.3 2.0 - 4.0 g/dL A-G Ratio 1.0 (L) 1.1 - 2.2    
TROPONIN I Collection Time: 05/14/19  9:55 AM  
Result Value Ref Range Troponin-I, Qt. <0.05 <0.05 ng/mL NT-PRO BNP Collection Time: 05/14/19  9:55 AM  
Result Value Ref Range NT pro-BNP 28 <125 PG/ML  
CK W/ REFLX CKMB Collection Time: 05/14/19  9:55 AM  
Result Value Ref Range CK 77 26 - 192 U/L  
MAGNESIUM Collection Time: 05/14/19  9:55 AM  
Result Value Ref Range Magnesium 2.2 1.6 - 2.4 mg/dL D DIMER Collection Time: 05/14/19  9:55 AM  
Result Value Ref Range D-dimer 0.25 0.00 - 0.65 mg/L FEU TSH 3RD GENERATION Collection Time: 05/14/19  9:56 AM  
Result Value Ref Range TSH 3.72 0.36 - 3.74 uIU/mL Radiologic Studies -  
XR CHEST PA LAT Final Result IMPRESSION: No acute cardiopulmonary disease. CT Results  (Last 48 hours) None CXR Results  (Last 48 hours) 05/14/19 0949  XR CHEST PA LAT Final result Impression:  IMPRESSION: No acute cardiopulmonary disease. Narrative: Indication:  Chest pain, palpitations. Exam: PA and lateral views of the chest.  
   
There is no prior study for direct comparison. Findings: Cardiomediastinal silhouette is within normal limits. Lungs are clear  
bilaterally. Pleural spaces are normal. Osseous structures are intact. Medical Decision Making I am the first provider for this patient.  
 
I reviewed the vital signs, available nursing notes, past medical history, past surgical history, family history and social history. Vital Signs-Reviewed the patient's vital signs. Patient Vitals for the past 24 hrs: 
 Temp Pulse Resp BP SpO2  
05/14/19 1100  (!) 57 12 118/50 99 % 05/14/19 1030  80 21 121/70 98 % 05/14/19 1000  (!) 58 18 117/62 100 % 05/14/19 0941     100 % 05/14/19 0938 99.1 °F (37.3 °C) 63 17 127/63 100 % Pulse Oximetry Analysis - 100% on RA Cardiac Monitor:  
Rate: 63 bpm 
Rhythm: Normal Sinus Rhythm ED EKG interpretation: 
Rhythm: sinus bradycardia with sinus arrhythmia; and regular . Rate (approx.): 50; Axis: normal; P wave: normal; QRS interval: normal ; ST/T wave: normal. This EKG was interpreted by Barbara Siemens, M.D. Records Reviewed: Nursing Notes, Old Medical Records, Previous electrocardiograms, Previous Radiology Studies and Previous Laboratory Studies Provider Notes (Medical Decision Making):  
Patient presents with palpitations and feeling of skipping beats. DDx: Likely PVCs due to either dehydration, infection, stress, electrolyte anomaly vs. Afib vs. Aflutter with RVR, sinus tachycardia, SVT, stable VTach, anxiety. Will get labs, EKG, and treat rhythm as indicated. Will obtain cardiology consult if warranted. If ED workup unremarkable, will refer to PCP for outpatient Holter vs event monitor. ED Course:  
Initial assessment performed. The patients presenting problems have been discussed, and they are in agreement with the care plan formulated and outlined with them. I have encouraged them to ask questions as they arise throughout their visit. I reviewed our electronic medical record system for any past medical records that were available that may contribute to the patient's current condition, the nursing notes and vital signs from today's visit. Joyce Rhodes MD 
Medications Administered During ED Course: 
Medications - No data to display ED Course as of May 14 2142 Tue May 14, 2019 1110 Patient reassessed. Patient has been monitored on the cardiac telemetry. Patient does have evidence of PVCs. Electrolytes and blood work all within normal limits. Discussed results with patient; recommend f/u with Cards for outpatient Holter and evaluation; return precautions given; pt agrees to plan. [HW] ED Course User Index 
[HW] Rocael Saenz MD  
 
Progress Note: 
Patient has been reassessed and reports feeling better and symptoms have improved after ED treatment. Romelia Escoto is able to tolerate PO and ambulate per baseline. Ashleigh Waters final labs and imaging have been reviewed with her. She has been counseled regarding her diagnosis. She verbally conveys understanding and agreement of the signs, symptoms, diagnosis, treatment and prognosis and additionally agrees to follow up as recommended with Dr. Roxy Gonzalez MD in 24 - 48 hours. She also agrees with the care-plan and conveys that all of her questions have been answered. I have also put together some discharge instructions for her that include: 1) educational information regarding their diagnosis, 2) how to care for their diagnosis at home, as well a 3) list of reasons why they would want to return to the ED prior to their follow-up appointment, should their condition change. I have answered all questions to the patient's satisfaction. Strict return precautions given. She both understood and agreed with plan as discussed above. Vital signs stable for discharge. Disposition: DISCHARGE The pt is ready for discharge. The pt's signs, symptoms, diagnosis, and discharge instructions have been discussed and pt has conveyed their understanding. The pt is to follow up as recommended or return to ER should their symptoms worsen. Plan has been discussed and pt is in agreement. PLAN: 
1. Discharge Medication List as of 5/14/2019 11:11 AM  
  
No current facility-administered medications for this encounter. Current Outpatient Medications:  
  hydrOXYzine HCl (ATARAX) 25 mg tablet, Take 1 Tab by mouth three (3) times daily as needed for Anxiety for up to 10 days. , Disp: 20 Tab, Rfl: 0 
 
2. Follow-up Information Follow up With Specialties Details Why Contact Info Jens Fuchs MD Internal Medicine   [place] [address] [address] [facility] [address] [facility] 
[phone] [facility] EMERGENCY DEPT Emergency Medicine  As needed, If symptoms worsen [address] [facility] 
[phone] Jet Aleman MD Cardiology Schedule an appointment as soon as possible for a visit in 1 day  [id number] [phone] Return to ED if worse Diagnosis Clinical Impression: 1. Palpitations 2. Symptomatic PVCs 3. Atypical chest pain 4. SOB (shortness of breath) Attestation: 
 
I personally performed the services described in this documentation on this date 5/14/2019 for patient [facility]. I am the sole author of this note. Yodit Hogan MD 
 
Please note that this dictation was completed with I AM AT, the [employer] voice recognition software. Quite often unanticipated grammatical, syntax, homophones, and other interpretive errors are inadvertently transcribed by the computer software. Please disregard these errors. Please excuse any errors that have escaped final proofreading. Thank you. This note will not be viewable in [address]

## 2019-05-14 NOTE — LETTER
NOTIFICATION RETURN TO WORK / SCHOOL 
 
5/14/2019 11:11 AM 
 
Ms. Ben Gallardo [address] [facility] [address] To Whom It May Concern: 
 
Ben Gallardo is currently under the care of [facility] EMERGENCY DEPT. She will return to work/school on: 5/16/19 If there are questions or concerns please have the patient contact our office. Sincerely, Adriana Abbott MD

## 2021-03-04 ENCOUNTER — OFFICE VISIT (OUTPATIENT)
Dept: ORTHOPEDIC SURGERY | Age: 36
End: 2021-03-04
Payer: COMMERCIAL

## 2021-03-04 VITALS
OXYGEN SATURATION: 100 % | WEIGHT: 241 LBS | HEIGHT: 70 IN | DIASTOLIC BLOOD PRESSURE: 58 MMHG | BODY MASS INDEX: 34.5 KG/M2 | SYSTOLIC BLOOD PRESSURE: 120 MMHG | TEMPERATURE: 97.2 F | HEART RATE: 70 BPM

## 2021-03-04 DIAGNOSIS — M72.2 PLANTAR FASCIA SYNDROME: Primary | ICD-10-CM

## 2021-03-04 PROCEDURE — 99203 OFFICE O/P NEW LOW 30 MIN: CPT | Performed by: ORTHOPAEDIC SURGERY

## 2021-03-04 RX ORDER — CELECOXIB 200 MG/1
200 CAPSULE ORAL 2 TIMES DAILY
Qty: 60 CAP | Refills: 0 | Status: SHIPPED | OUTPATIENT
Start: 2021-03-04

## 2021-03-04 NOTE — PROGRESS NOTES
3/4/2021      CC: right heel pain    HPI:      This is a 28y.o. year old female who does have a 1 month history of right heel pain, she states that she has never had pain on that foot before, she did have a lot of walking when she was shoveling snow and wearing the boots, the pain is on the base of her heel, more laterally. She has taken ibuprofen without relief, she denies any open wounds, no trauma, no other musculoskeletal complaints.       PMH:  Past Medical History:   Diagnosis Date    Abnormal Pap smear     Dr. Emili Hebert, last normal 1/2010    Abnormal Papanicolaou smear of cervix     AR (allergic rhinitis)     allergy resting normal per pt    Hypoglycemia 2004    Dr. Mily Marquis       PSxHx:  Past Surgical History:   Procedure Laterality Date    HX CHOLECYSTECTOMY  02/2014    HX HEENT      lasix    HX WISDOM TEETH EXTRACTION      TN CONIZATION CERVIX,LOOP ELECTRD N/A 5/26/2017    LOOP ELECTRODE EXCISION PROCEDURE OF CERVIX (LEEP)/ CONIZATION performed by Rachell Sosa MD at [address] [place] [address] Avenue:    Current Outpatient Medications:     etonogestrel (Theoplis Lidia), Nexplanon, Disp: , Rfl:     celecoxib (CELEBREX) 200 mg capsule, Take 1 Cap by mouth two (2) times a day., Disp: 61 Cap, Rfl: 0    All:  No Known Allergies    Social Hx:  Social History     Socioeconomic History    Marital status: SINGLE     Spouse name: Not on file    Number of children: 0    Years of education: Not on file    Highest education level: Not on file   Occupational History    Occupation: [address] [place] [address] [employer]   Tobacco Use    Smoking status: Never Smoker    Smokeless tobacco: Never Used   Substance and Sexual Activity    Alcohol use: Yes     Comment: 1 drink a month    Drug use: No    Sexual activity: Yes     Partners: Male     Birth control/protection: Pill       Family Hx:  Family History   Problem Relation Age of Onset    Cancer Maternal Grandmother         colon    MS Father     Diabetes Maternal Grandfather     Hypertension Maternal Grandfather     Hypertension Mother     High Cholesterol Mother          Review of Systems:       General: Denies headache, lethargy, fever, weight loss  Ears/Nose/Throat: Denies ear discharge, drainage, nosebleeds, hoarse voice, dental problems  Cardiovascular: Denies chest pain, shortness of breath  Lungs: Denies chest pain, breathing problems, wheezing, pneumonia  Stomach: Denies stomach pain, heartburn, constipation, irritable bowel  Skin: Denies rash, sores, open wounds  Musculoskeletal: Right foot pain  Genitourinary: Denies dysuria, hematuria, polyuria  Gastrointestinal: Denies constipation, obstipation, diarrhea  Neurological: Denies changes in sight, smell, hearing, taste, seizures. Denies loss of consciousness. Psychiatric: Denies depression, sleep pattern changes, anxiety, change in personality  Endocrine: Denies mood swings, heat or cold intolerance  Hematologic/Lymphatic: Denies anemia, purpura, petechia  Allergic/Immunologic: Denies swelling of throat, pain or swelling at lymph nodes      Physical Examination:    Visit Vitals  BP (!) 120/58 (BP 1 Location: Right arm, BP Patient Position: Sitting, BP Cuff Size: Large adult)   Pulse 70   Temp 97.2 °F (36.2 °C) (Tympanic)   Ht 5' 10\" (1.778 m)   Wt 241 lb (109.3 kg)   SpO2 100%   BMI 34.58 kg/m²        General: AOX3, no apparent distress  Psychiatric: mood and affect appropriate  Lungs: breathing is symmetric and unlabored bilaterally  Heart: regular rate and rhythm  Abdomen: no guarding  Head: normocephalic, atraumatic  Skin: No significant abnormalities, good turgor  Sensation intact to light touch: C5-T1 dermatomes  Muscular exam: 5/5 strength in all major muscle groups unless noted in specialty exam.    Extremities        Right lower extremity: No gross deformity. Mild tenderness palpation at the base of the calcaneus at the insertion of the plantar fascia, more laterally.   No restriction to range of motion of the hip, knee, ankle. Muscle bulk is appropriate without wasting. Sensation is intact to light touch in the L1-S1 dermatomes. Capillary refill is less than 2 seconds in the fingers. Strength testing is 5/5 at the major muscle groups of the hip, knee, ankle. Left lower extremity:  No gross deformity. No restriction to range of motion of the hip, knee, ankle. Muscle bulk is appropriate without wasting. Sensation is intact to light touch in the L1-S1 dermatomes. Capillary refill is less than 2 seconds in the fingers. Strength testing is 5/5 at the major muscle groups of the hip, knee, ankle. Diagnostics:    Pertinent Diagnostics: X-rays are available of the right foot, these indicate small calcaneal plantar spur, Seven's deformity, no other fractures, dislocations, osseous abnormalities. Assessment: Plantar fasciitis, right foot  Plan: This patient I had a long discussion regarding her treatment options, the overall scenario is likely that she has soft tissue irritation, she has an acute on chronic issue with her plantar fascia and that she may require a therapy program, night splinting, anti-inflammatory medications and routine follow-up, plan will be to see her in approximately 4 to 6 weeks for repeat clinical check, we will hold off on injections today, but that is a possibility if she plateaus in an unacceptable level. Night splint was applied today. Ms. Iris Stevenson has a reminder for a \"due or due soon\" health maintenance. I have asked that she contact her primary care provider for follow-up on this health maintenance.

## 2021-03-04 NOTE — PROGRESS NOTES
Identified pt with two pt identifiers (name and ). Reviewed chart in preparation for visit and have obtained necessary documentation. Justyna Bunn is a 28 y.o. female  Chief Complaint   Patient presents with    Foot Pain     RT foot/heel     Visit Vitals  BP (!) 120/58 (BP 1 Location: Right arm, BP Patient Position: Sitting, BP Cuff Size: Large adult)   Pulse 70   Temp 97.2 °F (36.2 °C) (Tympanic)   Ht 5' 10\" (1.778 m)   Wt 241 lb (109.3 kg)   SpO2 100%   BMI 34.58 kg/m²     1. Have you been to the ER, urgent care clinic since your last visit? Hospitalized since your last visit? No    2. Have you seen or consulted any other health care providers outside of the [address] [place] [address] since your last visit? Include any pap smears or colon screening.  No

## 2021-04-16 ENCOUNTER — OFFICE VISIT (OUTPATIENT)
Dept: ORTHOPEDIC SURGERY | Age: 36
End: 2021-04-16
Payer: COMMERCIAL

## 2021-04-16 VITALS
HEART RATE: 61 BPM | WEIGHT: 242 LBS | SYSTOLIC BLOOD PRESSURE: 116 MMHG | OXYGEN SATURATION: 99 % | HEIGHT: 70 IN | TEMPERATURE: 97.2 F | BODY MASS INDEX: 34.65 KG/M2 | DIASTOLIC BLOOD PRESSURE: 76 MMHG

## 2021-04-16 DIAGNOSIS — M72.2 PLANTAR FASCIA SYNDROME: Primary | ICD-10-CM

## 2021-04-16 PROCEDURE — 99212 OFFICE O/P EST SF 10 MIN: CPT | Performed by: ORTHOPAEDIC SURGERY

## 2021-04-16 NOTE — PROGRESS NOTES
4/16/2021      CC: right foot pain    HPI:      This is a 28y.o. year old female who presents for a follow up visit. The patient was last seen and diagnosed with right foot plantar fasciitis. The patient's treatments since the most recent visit have comprised of PT, night splinting. The patient has had moderate relief of the chief complaint. She still has some pain, but it is improving.       PMH:  Past Medical History:   Diagnosis Date    Abnormal Pap smear     Dr. Bee Miranda, last normal 1/2010    Abnormal Papanicolaou smear of cervix     AR (allergic rhinitis)     allergy resting normal per pt    Hypoglycemia 2004    Dr. Mindy Vasquez       PSxHx:  Past Surgical History:   Procedure Laterality Date    HX CHOLECYSTECTOMY  02/2014    HX HEENT      lasix    HX WISDOM TEETH EXTRACTION      MA CONIZATION CERVIX,LOOP ELECTRD N/A 5/26/2017    LOOP ELECTRODE EXCISION PROCEDURE OF CERVIX (LEEP)/ CONIZATION performed by Jacobo Gilbert MD at [address] [place] [address] Avenue:    Current Outpatient Medications:     etonogestrel (Lisa Kalata), Nexplanon, Disp: , Rfl:     celecoxib (CELEBREX) 200 mg capsule, Take 1 Cap by mouth two (2) times a day., Disp: 61 Cap, Rfl: 0    All:  No Known Allergies    Social Hx:  Social History     Socioeconomic History    Marital status: SINGLE     Spouse name: Not on file    Number of children: 0    Years of education: Not on file    Highest education level: Not on file   Occupational History    Occupation: [employer]   Tobacco Use    Smoking status: Never Smoker    Smokeless tobacco: Never Used   Substance and Sexual Activity    Alcohol use: Yes     Comment: 1 drink a month    Drug use: No    Sexual activity: Yes     Partners: Male     Birth control/protection: Pill       Family Hx:  Family History   Problem Relation Age of Onset    Cancer Maternal Grandmother         colon    MS Father     Diabetes Maternal Grandfather     Hypertension Maternal Grandfather     Hypertension Mother     High Cholesterol Mother          Review of Systems:       General: Denies headache, lethargy, fever, weight loss  Ears/Nose/Throat: Denies ear discharge, drainage, nosebleeds, hoarse voice, dental problems  Cardiovascular: Denies chest pain, shortness of breath  Lungs: Denies chest pain, breathing problems, wheezing, pneumonia  Stomach: Denies stomach pain, heartburn, constipation, irritable bowel  Skin: Denies rash, sores, open wounds  Musculoskeletal: right foot pain  Genitourinary: Denies dysuria, hematuria, polyuria  Gastrointestinal: Denies constipation, obstipation, diarrhea  Neurological: Denies changes in sight, smell, hearing, taste, seizures. Denies loss of consciousness. Psychiatric: Denies depression, sleep pattern changes, anxiety, change in personality  Endocrine: Denies mood swings, heat or cold intolerance  Hematologic/Lymphatic: Denies anemia, purpura, petechia  Allergic/Immunologic: Denies swelling of throat, pain or swelling at lymph nodes      Physical Examination:    Visit Vitals  /76 (BP 1 Location: Left upper arm, BP Patient Position: Sitting, BP Cuff Size: Large adult)   Pulse 61   Temp 97.2 °F (36.2 °C) (Tympanic)   Ht 5' 10\" (1.778 m)   Wt 242 lb (109.8 kg)   SpO2 99%   BMI 34.72 kg/m²        General: AOX3, no apparent distress  Psychiatric: mood and affect appropriate  Lungs: breathing is symmetric and unlabored bilaterally  Heart: regular rate and rhythm  Abdomen: no guarding  Head: normocephalic, atraumatic  Skin: No significant abnormalities, good turgor  Sensation intact to light touch: C5-T1 dermatomes  Muscular exam: 5/5 strength in all major muscle groups unless noted in specialty exam.    Extremities        Right lower extremity: No gross deformity. No restriction to range of motion of the hip, knee, ankle. Muscle bulk is appropriate without wasting. Sensation is intact to light touch in the L1-S1 dermatomes.   Capillary refill is less than 2 seconds in the fingers. Strength testing is 5/5 at the major muscle groups of the hip, knee, ankle. Left lower extremity:  No gross deformity. No restriction to range of motion of the hip, knee, ankle. Muscle bulk is appropriate without wasting. Sensation is intact to light touch in the L1-S1 dermatomes. Capillary refill is less than 2 seconds in the fingers. Strength testing is 5/5 at the major muscle groups of the hip, knee, ankle. Diagnostics:    Pertinent Diagnostics: none today    Assessment: Right foot plantar fasciitis  Plan: This patient I had a thorough discussion regarding her treatment options, the plan will be to have her proceed with continued physical therapy, she is tolerating this well and feels as though she is making good improvements. She would like to hold off on injections for now, I agree with this. The plan will be to have her follow-up with me in approximately months for repeat clinical check, in the meantime, she will continue night splinting, she will also proceed with continued physical therapy. Follow up one month. Ms. Gregg Christopher has a reminder for a \"due or due soon\" health maintenance. I have asked that she contact her primary care provider for follow-up on this health maintenance.

## 2021-04-16 NOTE — PROGRESS NOTES
Identified pt with two pt identifiers (name and ). Reviewed chart in preparation for visit and have obtained necessary documentation. Maria Guadalupe Jacobs is a 28 y.o. female  Chief Complaint   Patient presents with    Follow-up     RT foot     Visit Vitals  /76 (BP 1 Location: Left upper arm, BP Patient Position: Sitting, BP Cuff Size: Large adult)   Pulse 61   Temp 97.2 °F (36.2 °C) (Tympanic)   Ht 5' 10\" (1.778 m)   Wt 242 lb (109.8 kg)   SpO2 99%   BMI 34.72 kg/m²     1. Have you been to the ER, urgent care clinic since your last visit? Hospitalized since your last visit? No    2. Have you seen or consulted any other health care providers outside of the [address] [place] [address] since your last visit? Include any pap smears or colon screening.  No

## 2021-05-18 ENCOUNTER — OFFICE VISIT (OUTPATIENT)
Dept: ORTHOPEDIC SURGERY | Age: 36
End: 2021-05-18
Payer: COMMERCIAL

## 2021-05-18 VITALS
DIASTOLIC BLOOD PRESSURE: 74 MMHG | TEMPERATURE: 96.9 F | SYSTOLIC BLOOD PRESSURE: 119 MMHG | HEART RATE: 69 BPM | WEIGHT: 248 LBS | BODY MASS INDEX: 35.5 KG/M2 | HEIGHT: 70 IN | OXYGEN SATURATION: 99 %

## 2021-05-18 DIAGNOSIS — M72.2 PLANTAR FASCIA SYNDROME: Primary | ICD-10-CM

## 2021-05-18 PROCEDURE — 20550 NJX 1 TENDON SHEATH/LIGAMENT: CPT | Performed by: ORTHOPAEDIC SURGERY

## 2021-05-18 PROCEDURE — 99212 OFFICE O/P EST SF 10 MIN: CPT | Performed by: ORTHOPAEDIC SURGERY

## 2021-05-18 RX ORDER — BETAMETHASONE SODIUM PHOSPHATE AND BETAMETHASONE ACETATE 3; 3 MG/ML; MG/ML
6 INJECTION, SUSPENSION INTRA-ARTICULAR; INTRALESIONAL; INTRAMUSCULAR; SOFT TISSUE ONCE
Qty: 1 ML | Refills: 0
Start: 2021-05-18 | End: 2021-05-18

## 2021-05-18 NOTE — PROGRESS NOTES
5/18/2021      CC: Right heel pain      HPI:      This is a 39y.o. year old female who presents for a follow up visit. The patient was last seen and diagnosed with right-sided plantar fasciitis. The patient's treatments since the most recent visit have comprised of night bracing, physical therapy, NSAIDs. The patient has had no relief of the chief complaint.          PMH:  Past Medical History:   Diagnosis Date    Abnormal Pap smear     Dr. Danny López, last normal 1/2010    Abnormal Papanicolaou smear of cervix     AR (allergic rhinitis)     allergy resting normal per pt    Hypoglycemia 2004    Dr. Martínez Short       PSxHx:  Past Surgical History:   Procedure Laterality Date    HX CHOLECYSTECTOMY  02/2014    HX HEENT      lasix    HX WISDOM TEETH EXTRACTION      MS CONIZATION CERVIX,LOOP ELECTRD N/A 5/26/2017    LOOP ELECTRODE EXCISION PROCEDURE OF CERVIX (LEEP)/ CONIZATION performed by Portia Beltrán MD at [address] [place] [address] Avenue:    Current Outpatient Medications:     betamethasone (Celestone Soluspan) 6 mg/mL injection, 1 mL by Other route once for 1 dose., Disp: 1 mL, Rfl: 0    etonogestrel (Johanna Hurry), Nexplanon, Disp: , Rfl:     celecoxib (CELEBREX) 200 mg capsule, Take 1 Cap by mouth two (2) times a day., Disp: 60 Cap, Rfl: 0    All:  No Known Allergies    Social Hx:  Social History     Socioeconomic History    Marital status: SINGLE     Spouse name: Not on file    Number of children: 0    Years of education: Not on file    Highest education level: Not on file   Occupational History    Occupation: [employer]   Tobacco Use    Smoking status: Never Smoker    Smokeless tobacco: Never Used   Substance and Sexual Activity    Alcohol use: Yes     Comment: 1 drink a month    Drug use: No    Sexual activity: Yes     Partners: Male     Birth control/protection: Pill       Family Hx:  Family History   Problem Relation Age of Onset    Cancer Maternal Grandmother colon    MS Father     Diabetes Maternal Grandfather     Hypertension Maternal Grandfather     Hypertension Mother     High Cholesterol Mother          Review of Systems:       General: Denies headache, lethargy, fever, weight loss  Ears/Nose/Throat: Denies ear discharge, drainage, nosebleeds, hoarse voice, dental problems  Cardiovascular: Denies chest pain, shortness of breath  Lungs: Denies chest pain, breathing problems, wheezing, pneumonia  Stomach: Denies stomach pain, heartburn, constipation, irritable bowel  Skin: Denies rash, sores, open wounds  Musculoskeletal: Right foot pain  Genitourinary: Denies dysuria, hematuria, polyuria  Gastrointestinal: Denies constipation, obstipation, diarrhea  Neurological: Denies changes in sight, smell, hearing, taste, seizures. Denies loss of consciousness. Psychiatric: Denies depression, sleep pattern changes, anxiety, change in personality  Endocrine: Denies mood swings, heat or cold intolerance  Hematologic/Lymphatic: Denies anemia, purpura, petechia  Allergic/Immunologic: Denies swelling of throat, pain or swelling at lymph nodes      Physical Examination:    Visit Vitals  /74 (BP 1 Location: Right arm, BP Patient Position: Sitting, BP Cuff Size: Large adult)   Pulse 69   Temp 96.9 °F (36.1 °C) (Tympanic)   Ht 5' 10\" (1.778 m)   Wt 248 lb (112.5 kg)   SpO2 99%   BMI 35.58 kg/m²        General: AOX3, no apparent distress  Psychiatric: mood and affect appropriate  Lungs: breathing is symmetric and unlabored bilaterally  Heart: regular rate and rhythm  Abdomen: no guarding  Head: normocephalic, atraumatic  Skin: No significant abnormalities, good turgor  Sensation intact to light touch: C5-T1 dermatomes  Muscular exam: 5/5 strength in all major muscle groups unless noted in specialty exam.    Extremities       Right lower extremity: Tenderness palpation is noted at the insertion of the plantar fascia on the calcaneus. Full range of motion. No deformity.   No area of swelling. Sensation is intact light touch in the L1-S1 dermatomes. Left lower extremity: Extremity is examined, no evidence of gross deformity, no gross motor or sensory deficit. Full active and passive range of motion is noted. Muscle tone and bulk is within normal expected limits. Capillary refill is less than 2 seconds distally. Diagnostics:    Pertinent Diagnostics: None today    Assessment: Plantar fasciitis, right foot  Plan: This patient I had a long discussion regarding her treatment options, she has not yet maximized her current treatments, I have advised her that therapy, stretching exercises, anti-inflammatories, weight loss are very important initial tenants of treatment, additionally, injections could be helpful. We discussed that cortisone will hopefully allow her to be symptomatically managed well and therefore proceed with injection and close follow-up in 1 week. Procedure note: After correct site and side were identified by myself, right foot plantar heel was identified, point of maximum tenderness was identified, this was prepped with Betadine solution. 1% lidocaine through a 22-gauge needle was utilized to inject the area, once adequate anesthesia was confirmed, 6 mg of betamethasone were injected into the insertion of the plantar fascia at the heel. Needle was extracted. Patient tolerated well. No complications. Postinjection instructions were given. Ms. Rosita Andrew has a reminder for a \"due or due soon\" health maintenance. I have asked that she contact her primary care provider for follow-up on this health maintenance.

## 2021-05-18 NOTE — LETTER
5/18/2021 Patient: Gaines Mcardle YOB: 1985 Date of Visit: 5/18/2021 Cyndee Stephen MD 
[place] [address] [address] [facility] [address] [address] [address] [employer] Dear Cyndee Stephen MD, Thank you for referring Ms. Saray Pitts to [facility] for evaluation. My notes for this consultation are attached. If you have questions, please do not hesitate to call me. I look forward to following your patient along with you.  
 
 
Sincerely, 
 
Melissa Valdes, DO 
 
 Electrodesiccation And Curettage Text: The wound bed was treated with electrodesiccation and curettage after the biopsy was performed. Curettage Text: The wound bed was treated with curettage after the biopsy was performed. Size Of Lesion In Cm: 1 X Size Of Lesion In Cm: 0 Cryotherapy Text: The wound bed was treated with cryotherapy after the biopsy was performed. Notification Instructions: Patient will be notified of biopsy results. However, patient instructed to call the office if not contacted within 2 weeks. Wound Care: Vaseline Billing Type: [employer] Detail Level: Detailed Type Of Destruction Used: Curettage Dressing: bandage Destruction After The Procedure: No Anesthesia Type: 1% lidocaine with epinephrine and a 1:10 solution of 8.4% sodium bicarbonate Biopsy Type: H and E Render Post-Care Instructions In Note?: yes Biopsy Method: Personna blade Silver Nitrate Text: The wound bed was treated with silver nitrate after the biopsy was performed. Post-Care Instructions: -I reviewed with the patient in detail post-care instructions. Patient is to keep the pressure dressing dry and in place for 24 hours. Upon removing the bandage, the patient is to begin washing with soap and water and applying Vaseline, bacitracin, or polysporin (NOT neosporin) once to twice a day for 7-10 days or until healed. \\n-Bleeding is rare, but if it should occur apply direct pressure to the bleeding site for a full 15 minutes without easing up. If the bleeding continues despite your efforts, please call the office in which you were seen to speak with a provider. It may be necessary to go to the emergency room. \\n-your biopsy will be submitted to the appropriate lab for analysis and you should receive results within 7-10 business days. If the result is benign you may receive a card in the mail, otherwise we will contact you. \\n-if you have not heard from our office within 3 weeks either by phone or mail, please contact the office. Lab: [id number] Consent: Written consent was obtained and risks were reviewed including but not limited to scarring, infection, bleeding, scabbing, incomplete removal, nerve damage and allergy to anesthesia. Electrodesiccation Text: The wound bed was treated with electrodesiccation after the biopsy was performed. Anesthesia Volume In Cc (Will Not Render If 0): 0.3 Hemostasis: Donato's

## 2021-05-18 NOTE — PROGRESS NOTES
Identified pt with two pt identifiers (name and ). Reviewed chart in preparation for visit and have obtained necessary documentation. Amado Jj is a 39 y.o. female  Chief Complaint   Patient presents with    Follow-up     RT foot     Visit Vitals  /74 (BP 1 Location: Right arm, BP Patient Position: Sitting, BP Cuff Size: Large adult)   Pulse 69   Temp 96.9 °F (36.1 °C) (Tympanic)   Ht 5' 10\" (1.778 m)   Wt 248 lb (112.5 kg)   SpO2 99%   BMI 35.58 kg/m²     1. Have you been to the ER, urgent care clinic since your last visit? Hospitalized since your last visit? No    2. Have you seen or consulted any other health care providers outside of the [address] [place] [address] since your last visit? Include any pap smears or colon screening.  No

## 2021-05-27 ENCOUNTER — VIRTUAL VISIT (OUTPATIENT)
Dept: ORTHOPEDIC SURGERY | Age: 36
End: 2021-05-27
Payer: COMMERCIAL

## 2021-05-27 DIAGNOSIS — M72.2 PLANTAR FASCIA SYNDROME: Primary | ICD-10-CM

## 2021-05-27 PROCEDURE — 99213 OFFICE O/P EST LOW 20 MIN: CPT | Performed by: ORTHOPAEDIC SURGERY

## 2021-05-27 NOTE — PROGRESS NOTES
5/27/2021      CC: right heel pain    HPI:      This is a 39y.o. year old female who does have plantar fasciitis of the right foot, to undergo an injection a week ago, she states it felt very good for 2 days and then went back to at least some degree of the pain that she was having before. No other treatment so far.       PMH:  Past Medical History:   Diagnosis Date    Abnormal Pap smear     Dr. Kendall Hoover, last normal 1/2010    Abnormal Papanicolaou smear of cervix     AR (allergic rhinitis)     allergy resting normal per pt    Hypoglycemia 2004    Dr. Kelly Beard       PSxHx:  Past Surgical History:   Procedure Laterality Date    HX CHOLECYSTECTOMY  02/2014    HX HEENT      lasix    HX WISDOM TEETH EXTRACTION      [place] CONIZATION CERVIX,LOOP ELECTRD N/A 5/26/2017    LOOP ELECTRODE EXCISION PROCEDURE OF CERVIX (LEEP)/ CONIZATION performed by Joaquín Sanders MD at [address] [place] [address] Avenue:    Current Outpatient Medications:     etonogestrel (Desma Martinez), Nexplanon, Disp: , Rfl:     celecoxib (CELEBREX) 200 mg capsule, Take 1 Cap by mouth two (2) times a day., Disp: 61 Cap, Rfl: 0    All:  No Known Allergies    Social Hx:  Social History     Socioeconomic History    Marital status: SINGLE     Spouse name: Not on file    Number of children: 0    Years of education: Not on file    Highest education level: Not on file   Occupational History    Occupation: [employer]   Tobacco Use    Smoking status: Never Smoker    Smokeless tobacco: Never Used   Substance and Sexual Activity    Alcohol use: Yes     Comment: 1 drink a month    Drug use: No    Sexual activity: Yes     Partners: Male     Birth control/protection: Pill     Social Determinants of Health     Financial Resource Strain:     Difficulty of Paying Living Expenses:    Food Insecurity:     Worried About Running Out of Food in the Last Year:     [address] in the Last Year:    Transportation Needs:     Lack of Transportation (Medical):  Lack of Transportation (Non-Medical):    Physical Activity:     Days of Exercise per Week:     Minutes of Exercise per Session:    Stress:     Feeling of Stress :    Social Connections:     Frequency of Communication with Friends and Family:     Frequency of Social Gatherings with Friends and Family:     Attends Nondenominational Services:     Active Member of Clubs or Organizations:     Attends Club or Organization Meetings:     Marital Status:        Family Hx:  Family History   Problem Relation Age of Onset    Cancer Maternal Grandmother         colon    MS Father     Diabetes Maternal Grandfather     Hypertension Maternal Grandfather     Hypertension Mother     High Cholesterol Mother          Review of Systems:       General: Denies headache, lethargy, fever, weight loss  Ears/Nose/Throat: Denies ear discharge, drainage, nosebleeds, hoarse voice, dental problems  Cardiovascular: Denies chest pain, shortness of breath  Lungs: Denies chest pain, breathing problems, wheezing, pneumonia  Stomach: Denies stomach pain, heartburn, constipation, irritable bowel  Skin: Denies rash, sores, open wounds  Musculoskeletal: Right foot pain  Genitourinary: Denies dysuria, hematuria, polyuria  Gastrointestinal: Denies constipation, obstipation, diarrhea  Neurological: Denies changes in sight, smell, hearing, taste, seizures. Denies loss of consciousness. Psychiatric: Denies depression, sleep pattern changes, anxiety, change in personality  Endocrine: Denies mood swings, heat or cold intolerance  Hematologic/Lymphatic: Denies anemia, purpura, petechia  Allergic/Immunologic: Denies swelling of throat, pain or swelling at lymph nodes      Physical Examination:    There were no vitals taken for this visit.      General: AOX3, no apparent distress  Psychiatric: mood and affect appropriate        Diagnostics:    Pertinent Diagnostics: None today    Assessment: Right foot plantar fasciitis  Plan: This patient I had a long discussion regarding her treatment options, she has had at least a moderate response to an injection, I advised her that it has only been approximately 3 months since the onset of symptoms and only 2 months since initiation of therapy, I have recommended continued therapy, daily anti-inflammatory usage and bracing. Injection is meant to help, not cure. She is aware of this. I have advised her as well that there is a surgery that is possible, however not usually at this stage, I would like to have her lose weight prior to this as well. Plan will be to have her proceed with continued therapy for another month at least and see how she is doing after that. She stated her understanding and satisfaction. Patient was in [place] at the time of consultation. I was in the office while conducting this encounter. Consent:  She and/or her healthcare decision maker is aware that this patient-initiated Telehealth encounter is a billable service, with coverage as determined by her insurance carrier. She is aware that she may receive a bill and has provided verbal consent to proceed: Yes    This virtual visit was conducted telephone encounter only. -  I affirm this is a Patient Initiated Episode with an Established Patient who has not had a related appointment within my department in the past 7 days or scheduled within the next 24 hours. Note: this encounter is not billable if this call serves to triage the patient into an appointment for the relevant concern. Total Time: minutes: 11-20 minutes. Ms. Gregg Christopher has a reminder for a \"due or due soon\" health maintenance. I have asked that she contact her primary care provider for follow-up on this health maintenance.

## 2021-07-08 ENCOUNTER — OFFICE VISIT (OUTPATIENT)
Dept: ORTHOPEDIC SURGERY | Age: 36
End: 2021-07-08
Payer: COMMERCIAL

## 2021-07-08 VITALS
OXYGEN SATURATION: 100 % | HEART RATE: 58 BPM | TEMPERATURE: 97.2 F | WEIGHT: 248 LBS | BODY MASS INDEX: 35.5 KG/M2 | DIASTOLIC BLOOD PRESSURE: 74 MMHG | HEIGHT: 70 IN | SYSTOLIC BLOOD PRESSURE: 117 MMHG

## 2021-07-08 DIAGNOSIS — G89.29 CHRONIC PAIN IN RIGHT FOOT: Primary | ICD-10-CM

## 2021-07-08 DIAGNOSIS — M79.671 CHRONIC PAIN IN RIGHT FOOT: Primary | ICD-10-CM

## 2021-07-08 PROCEDURE — 99212 OFFICE O/P EST SF 10 MIN: CPT | Performed by: ORTHOPAEDIC SURGERY

## 2021-07-08 NOTE — PROGRESS NOTES
7/8/2021      CC: right foot pain    HPI:      This is a 39y.o. year old female who presents for a follow up visit. The patient was last seen and diagnosed with right plantar fasciitis. The patient's treatments since the most recent visit have comprised of physical therapy, injections, night splinting, she states that she has been compliant with the use but continues to have pain. The patient has had no relief of the chief complaint.      PT, injection  PMH:  Past Medical History:   Diagnosis Date    Abnormal Pap smear     Dr. Clari Barney, last normal 1/2010    Abnormal Papanicolaou smear of cervix     AR (allergic rhinitis)     allergy resting normal per pt    Hypoglycemia 2004    Dr. Chelsey Lobato       PSxHx:  Past Surgical History:   Procedure Laterality Date    HX CHOLECYSTECTOMY  02/2014    HX HEENT      lasix    HX WISDOM TEETH EXTRACTION      UT CONIZATION CERVIX,LOOP ELECTRD N/A 5/26/2017    LOOP ELECTRODE EXCISION PROCEDURE OF CERVIX (LEEP)/ CONIZATION performed by Miryam Ryder MD at [address] [place] [address] Avenue:    Current Outpatient Medications:     etonogestrel (Tuscaroraelizabeth Oropeza), Nexplanon, Disp: , Rfl:     celecoxib (CELEBREX) 200 mg capsule, Take 1 Cap by mouth two (2) times a day., Disp: 61 Cap, Rfl: 0    All:  No Known Allergies    Social Hx:  Social History     Socioeconomic History    Marital status: SINGLE     Spouse name: Not on file    Number of children: 0    Years of education: Not on file    Highest education level: Not on file   Occupational History    Occupation: [address] [place] [address] [employer]   Tobacco Use    Smoking status: Never Smoker    Smokeless tobacco: Never Used   Substance and Sexual Activity    Alcohol use: Yes     Comment: 1 drink a month    Drug use: No    Sexual activity: Yes     Partners: Male     Birth control/protection: Pill     Social Determinants of Health     Financial Resource Strain:     Difficulty of Paying Living Expenses:    Food Insecurity:     Worried About [facility] in the Last Year:    [address] in the Last Year:    Transportation Needs:     Lack of Transportation (Medical):  Lack of Transportation (Non-Medical):    Physical Activity:     Days of Exercise per Week:     Minutes of Exercise per Session:    Stress:     Feeling of Stress :    Social Connections:     Frequency of Communication with Friends and Family:     Frequency of Social Gatherings with Friends and Family:     Attends Mandaeism Services:     Active Member of Clubs or Organizations:     Attends Club or Organization Meetings:     Marital Status:        Family Hx:  Family History   Problem Relation Age of Onset    Cancer Maternal Grandmother         colon    MS Father     Diabetes Maternal Grandfather     Hypertension Maternal Grandfather     Hypertension Mother     High Cholesterol Mother          Review of Systems:       General: Denies headache, lethargy, fever, weight loss  Ears/Nose/Throat: Denies ear discharge, drainage, nosebleeds, hoarse voice, dental problems  Cardiovascular: Denies chest pain, shortness of breath  Lungs: Denies chest pain, breathing problems, wheezing, pneumonia  Stomach: Denies stomach pain, heartburn, constipation, irritable bowel  Skin: Denies rash, sores, open wounds  Musculoskeletal: Right foot pain  Genitourinary: Denies dysuria, hematuria, polyuria  Gastrointestinal: Denies constipation, obstipation, diarrhea  Neurological: Denies changes in sight, smell, hearing, taste, seizures. Denies loss of consciousness.   Psychiatric: Denies depression, sleep pattern changes, anxiety, change in personality  Endocrine: Denies mood swings, heat or cold intolerance  Hematologic/Lymphatic: Denies anemia, purpura, petechia  Allergic/Immunologic: Denies swelling of throat, pain or swelling at lymph nodes      Physical Examination:    Visit Vitals  /74 (BP 1 Location: Right arm, BP Patient Position: Sitting, BP Cuff Size: Large adult)   Pulse (!) 58   Temp 97.2 °F (36.2 °C) (Tympanic)   Ht 5' 10\" (1.778 m)   Wt 248 lb (112.5 kg)   SpO2 100%   BMI 35.58 kg/m²        General: AOX3, no apparent distress  Psychiatric: mood and affect appropriate  Lungs: breathing is symmetric and unlabored bilaterally  Heart: regular rate and rhythm  Abdomen: no guarding  Head: normocephalic, atraumatic  Skin: No significant abnormalities, good turgor  Sensation intact to light touch: C5-T1 dermatomes  Muscular exam: 5/5 strength in all major muscle groups unless noted in specialty exam.          Diagnostics:    Pertinent Diagnostics: None today    Assessment: Right foot plantar fasciitis, refractory to conservative care  Plan: This patient has failed 3 months of conservative care, nonoperative protocol has yielded very little in terms of any functional result, she states she feels as though she is getting worse due to over compensation. We discussed the anatomy. Due to her failure of conservative measures, we discussed the possibility of extra imaging. This would be an MRI of the hindfoot to rule out other pathologies or least validate obesity. In general, this would be the plan and she will follow-up once her MRI is complete. Ms. Dalia Flores has a reminder for a \"due or due soon\" health maintenance. I have asked that she contact her primary care provider for follow-up on this health maintenance.

## 2021-07-08 NOTE — PROGRESS NOTES
Identified pt with two pt identifiers (name and ). Reviewed chart in preparation for visit and have obtained necessary documentation. Judith Pedroza is a 39 y.o. female  Chief Complaint   Patient presents with    Follow-up     RT foot     Visit Vitals  /74 (BP 1 Location: Right arm, BP Patient Position: Sitting, BP Cuff Size: Large adult)   Pulse (!) 58   Temp 97.2 °F (36.2 °C) (Tympanic)   Ht 5' 10\" (1.778 m)   Wt 248 lb (112.5 kg)   SpO2 100%   BMI 35.58 kg/m²     1. Have you been to the ER, urgent care clinic since your last visit? Hospitalized since your last visit? No    2. Have you seen or consulted any other health care providers outside of the [address] [place] [address] since your last visit? Include any pap smears or colon screening.  No

## 2021-07-20 ENCOUNTER — HOSPITAL ENCOUNTER (OUTPATIENT)
Dept: MRI IMAGING | Age: 36
Discharge: HOME | End: 2021-07-20
Attending: ORTHOPAEDIC SURGERY
Payer: COMMERCIAL

## 2021-07-20 DIAGNOSIS — M79.671 CHRONIC PAIN IN RIGHT FOOT: ICD-10-CM

## 2021-07-20 DIAGNOSIS — G89.29 CHRONIC PAIN IN RIGHT FOOT: ICD-10-CM

## 2021-07-20 PROCEDURE — 73718 MRI LOWER EXTREMITY W/O DYE: CPT

## 2021-07-27 ENCOUNTER — VIRTUAL VISIT (OUTPATIENT)
Dept: ORTHOPEDIC SURGERY | Age: 36
End: 2021-07-27
Payer: COMMERCIAL

## 2021-07-27 DIAGNOSIS — M72.2 PLANTAR FASCIA SYNDROME: Primary | ICD-10-CM

## 2021-07-27 PROCEDURE — 99213 OFFICE O/P EST LOW 20 MIN: CPT | Performed by: ORTHOPAEDIC SURGERY

## 2021-07-28 NOTE — PROGRESS NOTES
7/27/2021      CC: Right foot pain    HPI:      This is a 39y.o. year old patient who presents for MRI follow up of the right foot. The patient states their pain is still consistent per the previous visit. PMH:  Past Medical History:   Diagnosis Date    Abnormal Pap smear     Dr. Herminio Delcid, last normal 1/2010    Abnormal Papanicolaou smear of cervix     AR (allergic rhinitis)     allergy resting normal per pt    Hypoglycemia 2004    Dr. Di Tapia       PSxHx:  Past Surgical History:   Procedure Laterality Date    HX CHOLECYSTECTOMY  02/2014    HX HEENT      lasix    HX WISDOM TEETH EXTRACTION      TX CONIZATION CERVIX,LOOP ELECTRD N/A 5/26/2017    LOOP ELECTRODE EXCISION PROCEDURE OF CERVIX (LEEP)/ CONIZATION performed by Javi Arrington MD at [address] [place] [address] Avenue:    Current Outpatient Medications:     etonogestrel (Adrien Rolls), Nexplanon, Disp: , Rfl:     celecoxib (CELEBREX) 200 mg capsule, Take 1 Cap by mouth two (2) times a day., Disp: 61 Cap, Rfl: 0    All:  No Known Allergies    Social Hx:  Social History     Socioeconomic History    Marital status: SINGLE     Spouse name: Not on file    Number of children: 0    Years of education: Not on file    Highest education level: Not on file   Occupational History    Occupation: [employer]   Tobacco Use    Smoking status: Never Smoker    Smokeless tobacco: Never Used   Substance and Sexual Activity    Alcohol use: Yes     Comment: 1 drink a month    Drug use: No    Sexual activity: Yes     Partners: Male     Birth control/protection: Pill     Social Determinants of Health     Financial Resource Strain:     Difficulty of Paying Living Expenses:    Food Insecurity:     Worried About Running Out of Food in the Last Year:     [address] in the Last Year:    Transportation Needs:     Lack of Transportation (Medical):      Lack of Transportation (Non-Medical):    Physical Activity:     Days of Exercise per Week:     Minutes of Exercise per Session:    Stress:     Feeling of Stress :    Social Connections:     Frequency of Communication with Friends and Family:     Frequency of Social Gatherings with Friends and Family:     Attends Jainism Services:     Active Member of Clubs or Organizations:     Attends Club or Organization Meetings:     Marital Status:        Family Hx:  Family History   Problem Relation Age of Onset    Cancer Maternal Grandmother         colon    MS Father     Diabetes Maternal Grandfather     Hypertension Maternal Grandfather     Hypertension Mother     High Cholesterol Mother          Review of Systems:       General: Denies headache, lethargy, fever, weight loss  Ears/Nose/Throat: Denies ear discharge, drainage, nosebleeds, hoarse voice, dental problems  Cardiovascular: Denies chest pain, shortness of breath  Lungs: Denies chest pain, breathing problems, wheezing, pneumonia  Stomach: Denies stomach pain, heartburn, constipation, irritable bowel  Skin: Denies rash, sores, open wounds  Musculoskeletal: Right foot pain  Genitourinary: Denies dysuria, hematuria, polyuria  Gastrointestinal: Denies constipation, obstipation, diarrhea  Neurological: Denies changes in sight, smell, hearing, taste, seizures. Denies loss of consciousness. Psychiatric: Denies depression, sleep pattern changes, anxiety, change in personality  Endocrine: Denies mood swings, heat or cold intolerance  Hematologic/Lymphatic: Denies anemia, purpura, petechia  Allergic/Immunologic: Denies swelling of throat, pain or swelling at lymph nodes      Physical Examination:    There were no vitals taken for this visit.      General: AOX3, no apparent distress  Psychiatric: mood and affect appropriate        Diagnostics:    Pertinent Diagnostics: MRI is available of the right foot indicates plantar fasciitis with possible peroneal tendon tear    Assessment: Plantar fasciitis, peroneal tendon tear, refractory to conservative measures  Plan: This patient I had a long discussion regarding treatment options, overall, we discussed that she has been through quite a bit of conservative measures at this point and would like to proceed with some form of more aggressive management. Have advised her that there may be any need for surgery going forward. She stated her understanding with this, she is also aware that I am not a foot surgeon, I will help work a referral for her as needed. She stated her understanding and satisfaction. Patient was in [place] at the time of consultation. I was in the office while conducting this encounter. Consent:  She and/or her healthcare decision maker is aware that this patient-initiated Telehealth encounter is a billable service, with coverage as determined by her insurance carrier. She is aware that she may receive a bill and has provided verbal consent to proceed: Yes    This virtual visit was conducted via [employer]. Pursuant to the emergency declaration under the [facility] waiver authority and the [employer] and [employer] Act, this Virtual  Visit was conducted to reduce the patient's risk of exposure to COVID-19 and provide continuity of care for an established patient. Services were provided through a video synchronous discussion virtually to substitute for in-person clinic visit. Due to this being a TeleHealth evaluation, many elements of the physical examination are unable to be assessed. Total Time: minutes: 21-30 minutes. Ms. Azalia Capellan has a reminder for a \"due or due soon\" health maintenance. I have asked that she contact her primary care provider for follow-up on this health maintenance.

## 2022-03-19 PROBLEM — C80.1: Status: ACTIVE | Noted: 2017-07-03

## 2022-11-17 NOTE — TELEPHONE ENCOUNTER
Patient is calling on nurse voicemail stating that the pharmacy has repeatedly tried to get a approval or denial for her birth control refill. She is a former patient of Homa Quan MD.      She has had multiple problem visit appointments and I am honestly not sure when she is supposed to be seen for next AE. She was noted to be requested for return in 6 weeks from pap and colpo (around 8/11/17) and she did not. She is stating that she \"does not feel like being pregnant now\" and wants a refill.     Since she is so far behind next due appointment is it okay to fill if she schedules AE? show

## 2023-05-12 RX ORDER — CELECOXIB 200 MG/1
CAPSULE ORAL 2 TIMES DAILY
COMMUNITY
Start: 2021-03-04

## 2023-05-25 ENCOUNTER — HOSPITAL ENCOUNTER (EMERGENCY)
Facility: HOSPITAL | Age: 38
Discharge: HOME | End: 2023-05-25
Attending: EMERGENCY MEDICINE
Payer: COMMERCIAL

## 2023-05-25 VITALS
HEIGHT: 70 IN | SYSTOLIC BLOOD PRESSURE: 123 MMHG | WEIGHT: 260.14 LBS | HEART RATE: 68 BPM | RESPIRATION RATE: 18 BRPM | BODY MASS INDEX: 37.24 KG/M2 | TEMPERATURE: 97.9 F | DIASTOLIC BLOOD PRESSURE: 75 MMHG | OXYGEN SATURATION: 97 %

## 2023-05-25 DIAGNOSIS — R00.2 PALPITATIONS: ICD-10-CM

## 2023-05-25 DIAGNOSIS — H81.12 BENIGN PAROXYSMAL POSITIONAL VERTIGO OF LEFT EAR: Primary | ICD-10-CM

## 2023-05-25 LAB
ALBUMIN SERPL-MCNC: 3.6 G/DL (ref 3.5–5)
ALBUMIN/GLOB SERPL: 1 (ref 1.1–2.2)
ALP SERPL-CCNC: 73 U/L (ref 45–117)
ALT SERPL-CCNC: 29 U/L (ref 12–78)
ANION GAP SERPL CALC-SCNC: 2 MMOL/L (ref 5–15)
AST SERPL-CCNC: 15 U/L (ref 15–37)
BASOPHILS # BLD: 0.1 K/UL (ref 0–0.1)
BASOPHILS NFR BLD: 1 % (ref 0–1)
BILIRUB SERPL-MCNC: 0.2 MG/DL (ref 0.2–1)
BUN SERPL-MCNC: 11 MG/DL (ref 6–20)
BUN/CREAT SERPL: 11 (ref 12–20)
CALCIUM SERPL-MCNC: 8.5 MG/DL (ref 8.5–10.1)
CHLORIDE SERPL-SCNC: 107 MMOL/L (ref 97–108)
CO2 SERPL-SCNC: 28 MMOL/L (ref 21–32)
CREAT SERPL-MCNC: 0.99 MG/DL (ref 0.55–1.02)
DIFFERENTIAL METHOD BLD: ABNORMAL
EOSINOPHIL # BLD: 0.3 K/UL (ref 0–0.4)
EOSINOPHIL NFR BLD: 3 % (ref 0–7)
ERYTHROCYTE [DISTWIDTH] IN BLOOD BY AUTOMATED COUNT: 13.9 % (ref 11.5–14.5)
GLOBULIN SER CALC-MCNC: 3.6 G/DL (ref 2–4)
GLUCOSE SERPL-MCNC: 108 MG/DL (ref 65–100)
HCG UR QL: NEGATIVE
HCT VFR BLD AUTO: 39.3 % (ref 35–47)
HGB BLD-MCNC: 12.3 G/DL (ref 11.5–16)
IMM GRANULOCYTES # BLD AUTO: 0.1 K/UL (ref 0–0.04)
IMM GRANULOCYTES NFR BLD AUTO: 0 % (ref 0–0.5)
LYMPHOCYTES # BLD: 2.8 K/UL (ref 0.8–3.5)
LYMPHOCYTES NFR BLD: 24 % (ref 12–49)
MCH RBC QN AUTO: 25.7 PG (ref 26–34)
MCHC RBC AUTO-ENTMCNC: 31.3 G/DL (ref 30–36.5)
MCV RBC AUTO: 82 FL (ref 80–99)
MONOCYTES # BLD: 0.8 K/UL (ref 0–1)
MONOCYTES NFR BLD: 7 % (ref 5–13)
NEUTS SEG # BLD: 7.8 K/UL (ref 1.8–8)
NEUTS SEG NFR BLD: 65 % (ref 32–75)
NRBC # BLD: 0 K/UL (ref 0–0.01)
NRBC BLD-RTO: 0 PER 100 WBC
PLATELET # BLD AUTO: 515 K/UL (ref 150–400)
PMV BLD AUTO: 9.3 FL (ref 8.9–12.9)
POTASSIUM SERPL-SCNC: 3.9 MMOL/L (ref 3.5–5.1)
PROT SERPL-MCNC: 7.2 G/DL (ref 6.4–8.2)
RBC # BLD AUTO: 4.79 M/UL (ref 3.8–5.2)
SODIUM SERPL-SCNC: 137 MMOL/L (ref 136–145)
TROPONIN I SERPL HS-MCNC: <4 NG/L (ref 0–51)
WBC # BLD AUTO: 11.9 K/UL (ref 3.6–11)

## 2023-05-25 PROCEDURE — 99284 EMERGENCY DEPT VISIT MOD MDM: CPT

## 2023-05-25 PROCEDURE — 93005 ELECTROCARDIOGRAM TRACING: CPT | Performed by: EMERGENCY MEDICINE

## 2023-05-25 PROCEDURE — 80053 COMPREHEN METABOLIC PANEL: CPT

## 2023-05-25 PROCEDURE — 84484 ASSAY OF TROPONIN QUANT: CPT

## 2023-05-25 PROCEDURE — 85025 COMPLETE CBC W/AUTO DIFF WBC: CPT

## 2023-05-25 PROCEDURE — 36415 COLL VENOUS BLD VENIPUNCTURE: CPT

## 2023-05-25 PROCEDURE — 6370000000 HC RX 637 (ALT 250 FOR IP): Performed by: EMERGENCY MEDICINE

## 2023-05-25 PROCEDURE — 81025 URINE PREGNANCY TEST: CPT

## 2023-05-25 RX ORDER — MECLIZINE HYDROCHLORIDE 25 MG/1
25 TABLET ORAL 3 TIMES DAILY PRN
Qty: 15 TABLET | Refills: 0 | Status: SHIPPED | OUTPATIENT
Start: 2023-05-25 | End: 2023-06-04

## 2023-05-25 RX ORDER — ONDANSETRON 4 MG/1
4 TABLET, FILM COATED ORAL 3 TIMES DAILY PRN
Qty: 30 TABLET | Refills: 0 | Status: SHIPPED | OUTPATIENT
Start: 2023-05-25

## 2023-05-25 RX ORDER — ONDANSETRON 4 MG/1
4 TABLET, ORALLY DISINTEGRATING ORAL EVERY 8 HOURS PRN
Status: DISCONTINUED | OUTPATIENT
Start: 2023-05-25 | End: 2023-05-25 | Stop reason: HOSPADM

## 2023-05-25 RX ORDER — MECLIZINE HCL 12.5 MG/1
25 TABLET ORAL ONCE
Status: COMPLETED | OUTPATIENT
Start: 2023-05-25 | End: 2023-05-25

## 2023-05-25 RX ADMIN — ONDANSETRON 4 MG: 4 TABLET, ORALLY DISINTEGRATING ORAL at 15:30

## 2023-05-25 RX ADMIN — MECLIZINE 25 MG: 12.5 TABLET ORAL at 15:30

## 2023-05-25 ASSESSMENT — PAIN SCALES - GENERAL: PAINLEVEL_OUTOF10: 0

## 2023-05-25 ASSESSMENT — LIFESTYLE VARIABLES
HOW OFTEN DO YOU HAVE A DRINK CONTAINING ALCOHOL: MONTHLY OR LESS
HOW MANY STANDARD DRINKS CONTAINING ALCOHOL DO YOU HAVE ON A TYPICAL DAY: 1 OR 2

## 2023-05-25 NOTE — ED PROVIDER NOTES
[facility] EMERGENCY DEPT  EMERGENCY DEPARTMENT ENCOUNTER       Pt Name: Sudhir Vega  MRN: [id number]  [place] 1985  Date of evaluation: 5/25/2023  Provider: Aly Kim DO   PCP: None None  Note Started: 10:58 PM 5/25/23     CHIEF COMPLAINT       Chief Complaint   Patient presents with    Palpitations     For the last two days in the left chest and sometimes shoulder. Dizziness     With position change that started this morning when waking up        HISTORY OF PRESENT ILLNESS: 1 or more elements      History From: Patient, History limited by: none     Sudhir Vega is a 45 y.o. female with a past medical history significant for cervical cancer status post the presented emergency room complaining of any dizziness and palpitations. Opticians been going on for longer, room spinning dizziness or this morning. It occurs with changes in position. Abates at rest.  Reports nausea, no vomiting. She still had some intermittent episodes of the palpitations and chest discomfort today. No shortness of breath. No unilateral leg pain or leg swelling       Please See MDM for Additional Details of the HPI/PMH  Nursing Notes were all reviewed and agreed with or any disagreements were addressed in the HPI. REVIEW OF SYSTEMS        Positives and Pertinent negatives as per HPI.     PAST HISTORY     Past Medical History:  Past Medical History:   Diagnosis Date    Abnormal Pap smear     Dr. Eleazar Golden, last normal 1/2010    Abnormal Papanicolaou smear of cervix     AR (allergic rhinitis)     allergy resting normal per pt    Hypoglycemia 2004    Dr. Kinza Muhammad       Past Surgical History:  Past Surgical History:   Procedure Laterality Date    CHOLECYSTECTOMY  02/2014    CONIZATION CERVIX,LOOP ELECTRD N/A 5/26/2017    LOOP ELECTRODE EXCISION PROCEDURE OF CERVIX (LEEP)/ CONIZATION performed by Pao Jack MD at [facility] MAIN OR    HEENT      lasix    WISDOM TOOTH EXTRACTION         Family History:  Family History   Problem

## 2023-05-25 NOTE — DISCHARGE INSTRUCTIONS
Please look up the Epley maneuver on YouTube and the foster half somersault.   These are exercises you can do to help with your vertigo

## 2023-05-26 LAB
EKG ATRIAL RATE: 64 BPM
EKG DIAGNOSIS: NORMAL
EKG P AXIS: 58 DEGREES
EKG P-R INTERVAL: 126 MS
EKG Q-T INTERVAL: 420 MS
EKG QRS DURATION: 98 MS
EKG QTC CALCULATION (BAZETT): 433 MS
EKG R AXIS: 26 DEGREES
EKG T AXIS: 12 DEGREES
EKG VENTRICULAR RATE: 64 BPM

## 2024-12-12 NOTE — MR AVS SNAPSHOT
Visit Information Date & Time Provider Department Dept. Phone Encounter #  
 6/30/2017  9:00 AM Zoey Neves, [address] [facility] [phone] [id number] Your Appointments 8/28/2017  9:20 AM  
PROBLEM VISIT with Zoey Neves MD  
[address]) Appt Note: repeat pap [address]  
[phone]  
  
   
 [address] [place] [address] [place] Upcoming Health Maintenance Date Due INFLUENZA AGE 9 TO ADULT 8/1/2017 PAP AKA CERVICAL CYTOLOGY 4/26/2020 Allergies as of 6/30/2017  Review Complete On: 6/30/2017 By: Sandra Porter LPN No Known Allergies Current Immunizations  Reviewed on 12/3/2013 Name Date Hepatitis B Vaccine 8/30/2011, 3/14/2011, 2/8/2011 MMR Vaccine 3/18/1996, 11/21/1986 Meningococcal Vaccine 7/30/2003 PPD 2/4/2011 TD Vaccine 9/1/2005, 1985, 1985 TDAP Vaccine 11/6/2000 Td, Adsorbed PF 12/3/2013 Not reviewed this visit Vitals BP Pulse Height(growth percentile) Weight(growth percentile) LMP BMI  
 131/69 87 5' 10\" (1.778 m) 238 lb (108 kg) 06/11/2017 34.15 kg/m2 OB Status Smoking Status Having regular periods Never Smoker BMI and BSA Data Body Mass Index Body Surface Area  
 34.15 kg/m 2 2.31 m 2 Preferred Pharmacy Pharmacy Name Phone [address] AT [address] [place] [address]. [phone] Your Updated Medication List  
  
   
This list is accurate as of: 6/30/17  9:19 AM.  Always use your most recent med list.  
  
  
  
  
 drospirenone-ethinyl estradiol 3-0.02 mg Tab Commonly known as:  MARY Take 1 Tab by mouth daily. Indications: Pregnancy Contraception  
  
 ibuprofen 800 mg tablet Commonly known as:  MOTRIN  
 Take 1 Tab by mouth every eight (8) hours as needed for Pain. Indications: Pain Introducing \[facility]\"" & HEALTH SERVICES! Dear Meme Bernstein: Thank you for requesting a [employer] account. Our records indicate that you already have an active [employer] account. You can access your account anytime at [email or web address] Did you know that you can access your hospital and ER discharge instructions at any time in [employer]? You can also review all of your test results from your hospital stay or ER visit. Additional Information If you have questions, please visit the Frequently Asked Questions section of the [employer] website at [email or web address] Remember, [employer] is NOT to be used for urgent needs. For medical emergencies, dial 911. Now available from your iPhone and Android! Please provide this summary of care documentation to your next provider. Your primary care clinician is listed as Oscar Lock. If you have any questions after today's visit, please call [phone]. 3 = A little assistance

## 2025-06-06 NOTE — ED NOTES
Pt presents to ED with c/o heart palpitations and dizziness. Pt states she has seen a cardiologist before for PVC's. Pt went to an urgent care center today and was advised to come to the ED.      Laya Sagastume RN  05/25/23 [id number] 2

## (undated) DEVICE — COVER LT HNDL BLU PLAS

## (undated) DEVICE — STERILE POLYISOPRENE POWDER-FREE SURGICAL GLOVES: Brand: PROTEXIS

## (undated) DEVICE — (D)SYR 10ML 1/5ML GRAD NSAF -- PKGING CHANGE USE ITEM 338027

## (undated) DEVICE — CATH URETH INTMIT ROB 16FR FUN -- CONVERT TO ITEM 179520

## (undated) DEVICE — CUSTOM PROCTO SWABS: Brand: DEROYAL

## (undated) DEVICE — LIGHT HANDLE: Brand: DEVON

## (undated) DEVICE — INFECTION CONTROL KIT SYS

## (undated) DEVICE — PAD SANIT NPKN 4IN GRD

## (undated) DEVICE — NEEDLE HYPO 22GA L1.5IN BLK POLYPR HUB S STL REG BVL STR

## (undated) DEVICE — PERI/GYN PACK: Brand: CONVERTORS

## (undated) DEVICE — STRAP RESTRAIN W3.5XL19IN TECLIN STRRP POS LEG DURING LITH

## (undated) DEVICE — NDL SPNE QNCKE 18GX3.5IN LF --

## (undated) DEVICE — MEDI-VAC NON-CONDUCTIVE SUCTION TUBING: Brand: CARDINAL HEALTH

## (undated) DEVICE — REM POLYHESIVE ADULT PATIENT RETURN ELECTRODE: Brand: VALLEYLAB

## (undated) DEVICE — TELFA NON-ADHERENT PADS PREPAK: Brand: TELFA

## (undated) DEVICE — DEVON™ KNEE AND BODY STRAP 60" X 3" (1.5 M X 7.6 CM): Brand: DEVON

## (undated) DEVICE — ROCKER SWITCH PENCIL BLADE ELECTRODE, HOLSTER: Brand: EDGE

## (undated) DEVICE — TOWEL SURG W17XL27IN STD BLU COT NONFENESTRATED PREWASHED

## (undated) DEVICE — SURGICAL PROCEDURE PACK BASIN MAJ SET CUST NO CAUT

## (undated) DEVICE — ELECTRODE ES L11CM DIA5MM BALL SHFT RED DISP UTAHLOOP

## (undated) DEVICE — SMOKE EVACUATION PENCIL: Brand: VALLEYLAB